# Patient Record
Sex: FEMALE | Race: OTHER | Employment: UNEMPLOYED | URBAN - METROPOLITAN AREA
[De-identification: names, ages, dates, MRNs, and addresses within clinical notes are randomized per-mention and may not be internally consistent; named-entity substitution may affect disease eponyms.]

---

## 2017-05-15 ENCOUNTER — OFFICE VISIT (OUTPATIENT)
Dept: FAMILY MEDICINE CLINIC | Age: 42
End: 2017-05-15

## 2017-05-15 ENCOUNTER — HOSPITAL ENCOUNTER (OUTPATIENT)
Dept: CT IMAGING | Age: 42
Discharge: HOME | End: 2017-05-15
Attending: FAMILY MEDICINE | Admitting: FAMILY MEDICINE

## 2017-05-15 VITALS
BODY MASS INDEX: 34.81 KG/M2 | TEMPERATURE: 97.9 F | OXYGEN SATURATION: 98 % | DIASTOLIC BLOOD PRESSURE: 80 MMHG | SYSTOLIC BLOOD PRESSURE: 110 MMHG | HEART RATE: 74 BPM | HEIGHT: 66 IN | RESPIRATION RATE: 14 BRPM | WEIGHT: 216.6 LBS

## 2017-05-15 DIAGNOSIS — R10.9 RIGHT FLANK PAIN: Primary | ICD-10-CM

## 2017-05-15 DIAGNOSIS — R30.0 DYSURIA: ICD-10-CM

## 2017-05-15 DIAGNOSIS — R10.9 RIGHT FLANK PAIN: ICD-10-CM

## 2017-05-15 LAB
BILIRUBIN, POC: NEGATIVE
BLOOD URINE, POC: ABNORMAL
CLARITY, POC: ABNORMAL
COLOR, POC: YELLOW
GLUCOSE URINE, POC: NEGATIVE
KETONES, POC: NEGATIVE
LEUKOCYTE EST, POC: ABNORMAL
NITRITE, POC: NEGATIVE
PH, POC: 5.5
PROTEIN, POC: NEGATIVE
SPECIFIC GRAVITY, POC: 1.02
UROBILINOGEN, POC: 0.2

## 2017-05-15 PROCEDURE — 99203 OFFICE O/P NEW LOW 30 MIN: CPT | Performed by: FAMILY MEDICINE

## 2017-05-15 RX ORDER — NAPROXEN 500 MG/1
TABLET ORAL
COMMUNITY
Start: 2017-02-12 | End: 2017-07-24 | Stop reason: ALTCHOICE

## 2017-05-15 RX ORDER — TAMSULOSIN HYDROCHLORIDE 0.4 MG/1
0.4 CAPSULE ORAL DAILY
Qty: 30 CAPSULE | Refills: 1 | Status: SHIPPED | OUTPATIENT
Start: 2017-05-15 | End: 2017-07-24 | Stop reason: ALTCHOICE

## 2017-05-15 RX ORDER — HYDROCODONE BITARTRATE AND ACETAMINOPHEN 5; 325 MG/1; MG/1
1 TABLET ORAL EVERY 8 HOURS PRN
Qty: 20 TABLET | Refills: 0 | Status: SHIPPED | OUTPATIENT
Start: 2017-05-15 | End: 2017-05-22

## 2017-05-22 ASSESSMENT — ENCOUNTER SYMPTOMS
VOMITING: 1
RESPIRATORY NEGATIVE: 1
NAUSEA: 1
ABDOMINAL PAIN: 1

## 2017-06-16 ENCOUNTER — OFFICE VISIT (OUTPATIENT)
Dept: FAMILY MEDICINE CLINIC | Age: 42
End: 2017-06-16

## 2017-06-16 VITALS
TEMPERATURE: 97.6 F | OXYGEN SATURATION: 98 % | HEART RATE: 90 BPM | RESPIRATION RATE: 16 BRPM | HEIGHT: 65 IN | DIASTOLIC BLOOD PRESSURE: 80 MMHG | BODY MASS INDEX: 35.75 KG/M2 | SYSTOLIC BLOOD PRESSURE: 120 MMHG | WEIGHT: 214.6 LBS

## 2017-06-16 DIAGNOSIS — R35.0 FREQUENCY OF URINATION: ICD-10-CM

## 2017-06-16 DIAGNOSIS — N30.90 CYSTITIS: Primary | ICD-10-CM

## 2017-06-16 LAB
BILIRUBIN, POC: NORMAL
BLOOD URINE, POC: NORMAL
CLARITY, POC: CLEAR
COLOR, POC: YELLOW
GLUCOSE BLD-MCNC: 155 MG/DL
GLUCOSE URINE, POC: NORMAL
KETONES, POC: NORMAL
LEUKOCYTE EST, POC: NORMAL
NITRITE, POC: NORMAL
PH, POC: 5.5
PROTEIN, POC: NORMAL
SPECIFIC GRAVITY, POC: 1.02
UROBILINOGEN, POC: NORMAL

## 2017-06-16 PROCEDURE — 82962 GLUCOSE BLOOD TEST: CPT | Performed by: CLINICAL NURSE SPECIALIST

## 2017-06-16 PROCEDURE — 99213 OFFICE O/P EST LOW 20 MIN: CPT | Performed by: CLINICAL NURSE SPECIALIST

## 2017-06-16 RX ORDER — SULFAMETHOXAZOLE AND TRIMETHOPRIM 800; 160 MG/1; MG/1
1 TABLET ORAL 2 TIMES DAILY
Qty: 14 TABLET | Refills: 0 | Status: SHIPPED | OUTPATIENT
Start: 2017-06-16 | End: 2017-06-23

## 2017-06-16 ASSESSMENT — ENCOUNTER SYMPTOMS
CONSTIPATION: 0
DIARRHEA: 0
VOMITING: 0
NAUSEA: 0
ABDOMINAL PAIN: 0

## 2017-06-18 LAB
ORGANISM: ABNORMAL
URINE CULTURE, ROUTINE: ABNORMAL

## 2017-06-19 DIAGNOSIS — R31.9 HEMATURIA: Primary | ICD-10-CM

## 2017-06-19 DIAGNOSIS — N30.01 ACUTE CYSTITIS WITH HEMATURIA: Primary | ICD-10-CM

## 2017-06-19 RX ORDER — CIPROFLOXACIN 250 MG/1
250 TABLET, FILM COATED ORAL 2 TIMES DAILY
Qty: 14 TABLET | Refills: 0 | Status: SHIPPED | OUTPATIENT
Start: 2017-06-19 | End: 2017-06-26

## 2017-07-24 ENCOUNTER — OFFICE VISIT (OUTPATIENT)
Dept: FAMILY MEDICINE CLINIC | Age: 42
End: 2017-07-24

## 2017-07-24 VITALS
HEIGHT: 65 IN | TEMPERATURE: 97.7 F | BODY MASS INDEX: 36.06 KG/M2 | WEIGHT: 216.4 LBS | OXYGEN SATURATION: 98 % | RESPIRATION RATE: 16 BRPM | HEART RATE: 84 BPM | DIASTOLIC BLOOD PRESSURE: 82 MMHG | SYSTOLIC BLOOD PRESSURE: 110 MMHG

## 2017-07-24 DIAGNOSIS — R82.90 ABNORMAL URINALYSIS: ICD-10-CM

## 2017-07-24 DIAGNOSIS — Z00.00 ANNUAL PHYSICAL EXAM: Primary | ICD-10-CM

## 2017-07-24 LAB
BILIRUBIN, POC: ABNORMAL
BLOOD URINE, POC: ABNORMAL
CLARITY, POC: CLEAR
COLOR, POC: YELLOW
GLUCOSE URINE, POC: ABNORMAL
KETONES, POC: ABNORMAL
LEUKOCYTE EST, POC: ABNORMAL
NITRITE, POC: ABNORMAL
PH, POC: 5.5
PROTEIN, POC: ABNORMAL
SPECIFIC GRAVITY, POC: 1.03
UROBILINOGEN, POC: 0.2

## 2017-07-24 PROCEDURE — 99396 PREV VISIT EST AGE 40-64: CPT | Performed by: FAMILY MEDICINE

## 2017-07-25 DIAGNOSIS — Z00.00 ANNUAL PHYSICAL EXAM: ICD-10-CM

## 2017-07-25 LAB
A/G RATIO: 1.3 (ref 1.1–2.2)
ALBUMIN SERPL-MCNC: 4 G/DL (ref 3.4–5)
ALP BLD-CCNC: 61 U/L (ref 40–129)
ALT SERPL-CCNC: 10 U/L (ref 10–40)
ANION GAP SERPL CALCULATED.3IONS-SCNC: 12 MMOL/L (ref 3–16)
AST SERPL-CCNC: 11 U/L (ref 15–37)
BASOPHILS ABSOLUTE: 0 K/UL (ref 0–0.2)
BASOPHILS RELATIVE PERCENT: 0.2 %
BILIRUB SERPL-MCNC: 0.3 MG/DL (ref 0–1)
BUN BLDV-MCNC: 19 MG/DL (ref 7–20)
CALCIUM SERPL-MCNC: 8.6 MG/DL (ref 8.3–10.6)
CHLORIDE BLD-SCNC: 103 MMOL/L (ref 99–110)
CHOLESTEROL, TOTAL: 247 MG/DL (ref 0–199)
CO2: 24 MMOL/L (ref 21–32)
CREAT SERPL-MCNC: <0.5 MG/DL (ref 0.6–1.1)
EOSINOPHILS ABSOLUTE: 0.1 K/UL (ref 0–0.6)
EOSINOPHILS RELATIVE PERCENT: 1.4 %
GFR AFRICAN AMERICAN: >60
GFR NON-AFRICAN AMERICAN: >60
GLOBULIN: 3.2 G/DL
GLUCOSE BLD-MCNC: 115 MG/DL (ref 70–99)
HCT VFR BLD CALC: 38.9 % (ref 36–48)
HDLC SERPL-MCNC: 53 MG/DL (ref 40–60)
HEMOGLOBIN: 12.9 G/DL (ref 12–16)
LDL CHOLESTEROL CALCULATED: 168 MG/DL
LYMPHOCYTES ABSOLUTE: 2.3 K/UL (ref 1–5.1)
LYMPHOCYTES RELATIVE PERCENT: 34.8 %
MCH RBC QN AUTO: 29.8 PG (ref 26–34)
MCHC RBC AUTO-ENTMCNC: 33.2 G/DL (ref 31–36)
MCV RBC AUTO: 89.6 FL (ref 80–100)
MONOCYTES ABSOLUTE: 0.4 K/UL (ref 0–1.3)
MONOCYTES RELATIVE PERCENT: 5.5 %
NEUTROPHILS ABSOLUTE: 3.9 K/UL (ref 1.7–7.7)
NEUTROPHILS RELATIVE PERCENT: 58.1 %
PDW BLD-RTO: 13.7 % (ref 12.4–15.4)
PLATELET # BLD: 217 K/UL (ref 135–450)
PMV BLD AUTO: 8.6 FL (ref 5–10.5)
POTASSIUM SERPL-SCNC: 4.3 MMOL/L (ref 3.5–5.1)
RBC # BLD: 4.34 M/UL (ref 4–5.2)
SODIUM BLD-SCNC: 139 MMOL/L (ref 136–145)
TOTAL PROTEIN: 7.2 G/DL (ref 6.4–8.2)
TRIGL SERPL-MCNC: 129 MG/DL (ref 0–150)
TSH REFLEX FT4: 2.85 UIU/ML (ref 0.27–4.2)
VITAMIN D 25-HYDROXY: 8.1 NG/ML
VLDLC SERPL CALC-MCNC: 26 MG/DL
WBC # BLD: 6.7 K/UL (ref 4–11)

## 2017-07-26 LAB
HIV-1 AND HIV-2 ANTIBODIES: NORMAL
ORGANISM: ABNORMAL
URINE CULTURE, ROUTINE: ABNORMAL

## 2017-07-29 ASSESSMENT — ENCOUNTER SYMPTOMS
GASTROINTESTINAL NEGATIVE: 1
EYES NEGATIVE: 1
RESPIRATORY NEGATIVE: 1

## 2017-08-01 DIAGNOSIS — E55.9 VITAMIN D DEFICIENCY: Primary | ICD-10-CM

## 2017-08-01 RX ORDER — ERGOCALCIFEROL 1.25 MG/1
50000 CAPSULE ORAL WEEKLY
Qty: 20 CAPSULE | Refills: 0 | Status: SHIPPED | OUTPATIENT
Start: 2017-08-01 | End: 2021-02-05

## 2017-08-29 ENCOUNTER — HOSPITAL ENCOUNTER (OUTPATIENT)
Dept: MAMMOGRAPHY | Age: 42
Discharge: HOME | End: 2017-08-29
Attending: FAMILY MEDICINE | Admitting: FAMILY MEDICINE

## 2017-08-29 DIAGNOSIS — Z12.31 VISIT FOR SCREENING MAMMOGRAM: ICD-10-CM

## 2018-07-12 ENCOUNTER — OFFICE VISIT (OUTPATIENT)
Dept: FAMILY MEDICINE CLINIC | Age: 43
End: 2018-07-12

## 2018-07-12 VITALS
HEIGHT: 65 IN | TEMPERATURE: 97.9 F | DIASTOLIC BLOOD PRESSURE: 72 MMHG | RESPIRATION RATE: 12 BRPM | OXYGEN SATURATION: 97 % | BODY MASS INDEX: 36.82 KG/M2 | SYSTOLIC BLOOD PRESSURE: 112 MMHG | WEIGHT: 221 LBS | HEART RATE: 98 BPM

## 2018-07-12 DIAGNOSIS — R60.9 DEPENDENT EDEMA: ICD-10-CM

## 2018-07-12 DIAGNOSIS — Z87.440 HISTORY OF UTI: ICD-10-CM

## 2018-07-12 DIAGNOSIS — G43.829 MENSTRUAL MIGRAINE WITHOUT STATUS MIGRAINOSUS, NOT INTRACTABLE: ICD-10-CM

## 2018-07-12 DIAGNOSIS — R31.9 HEMATURIA, UNSPECIFIED TYPE: ICD-10-CM

## 2018-07-12 DIAGNOSIS — M67.88 RIGHT PERONEAL TENDINOSIS: Primary | ICD-10-CM

## 2018-07-12 PROBLEM — R51.9 CHRONIC INTRACTABLE HEADACHE: Status: ACTIVE | Noted: 2018-07-12

## 2018-07-12 PROBLEM — G89.29 CHRONIC INTRACTABLE HEADACHE: Status: ACTIVE | Noted: 2018-07-12

## 2018-07-12 PROCEDURE — G8417 CALC BMI ABV UP PARAM F/U: HCPCS | Performed by: FAMILY MEDICINE

## 2018-07-12 PROCEDURE — 1036F TOBACCO NON-USER: CPT | Performed by: FAMILY MEDICINE

## 2018-07-12 PROCEDURE — G8427 DOCREV CUR MEDS BY ELIG CLIN: HCPCS | Performed by: FAMILY MEDICINE

## 2018-07-12 PROCEDURE — 99214 OFFICE O/P EST MOD 30 MIN: CPT | Performed by: FAMILY MEDICINE

## 2018-07-12 RX ORDER — NAPROXEN 500 MG/1
500 TABLET ORAL
COMMUNITY
Start: 2017-08-28 | End: 2018-07-18 | Stop reason: ALTCHOICE

## 2018-07-12 RX ORDER — IBUPROFEN 800 MG/1
TABLET ORAL
COMMUNITY
Start: 2016-10-03 | End: 2018-07-18 | Stop reason: ALTCHOICE

## 2018-07-12 RX ORDER — METHYLPREDNISOLONE 4 MG/1
TABLET ORAL
Qty: 1 KIT | Refills: 0 | Status: SHIPPED | OUTPATIENT
Start: 2018-07-12 | End: 2018-07-18

## 2018-07-12 NOTE — PATIENT INSTRUCTIONS
ÇáØÈíÈ REESISTII ÈåÇ¡ JESQWIVZ ÈØÈíÈß ÚäÏ SCQEKDHC ãä Ãí ãÔßáÇÊ. æãä ÇáÌíÏ ÃíÖðÇ Ãä ÊÚÑÝ äÊÇÆÌ QJRXUVKX æßÐáß EYRQDHVK ÈÞÇÆãÉ ÇáÃÏæíÉ ÇáÊí GBAZSIOR. ßíÝ íãßäß ÇáÇÚÊäÇÁ ÈäÝÓß Ýí BQDXKN¿  ÞÑÇÁÉ ÈØÇÞÇÊ QZLWGRU ÇáØÚÇã   · ÇÞÑÃ ÈØÇÞÇÊ TROGKAV ÇáØÚÇã ÇáãæÌæÏÉ Úáì ÇáÚõáÈ æÚÈæÇÊ ÇáØÚÇã. EZDFRBQPW ÊÎÈÑß ÈãÞÏÇÑ ÇáÕæÏíæã Ýí ÇáæÌÈÉ. æÊÃßÏ ãä ÇÚÊÈÇÑ ÍÌã ÇáæÌÈÉ. æÅÐÇ ÊäÇæáÊ ÃßËÑ ãä ÍÌã ÇáæÌÈÉ¡ ÝåÐÇ íÚäí ÊäÇæá ÇáãÒíÏ ãä ÇáÕæÏíæã.  · ßãÇ ÊÎÈÑß åÐå ÇáÈØÇÞÇÊ ÃíÖðÇ ÈÞíãÉ ÇáäÓÈÉ ÇáãÆæíÉ ãä ÇáÕæÏíæã Ýí Çáíæã. ÇÎÊÑ ÇáãäÊÌÇÊ ãäÎÝÖÉ ÞíãÉ ÇáäÓÈÉ ÇáãÆæíÉ ãä ÇáÕæÏíæã Ýí Çáíæã.  · æÇäÊÈå Åáì Ãä ÇáÕæÏíæã íãßä Ãä íÊÓáá Åáíß Ýí ÃÔßÇá ÃÎÑì ÛíÑ OSTIO¡ ÈãÇ íÔãá RQNAFCLBNP ÃÍÇÏí ÇáÕæÏíæã æÓíÊÑÇÊ ÇáÕæÏíæã æËäÇÆí ßÑÈæäÇÊ ÇáÕæÏíæã (ÕæÏÇ ÇáÎÈíÒ). æíßËÑ ÅÖÇÝÉ ÌáæÊÇãÇÊ ÃÍÇÏí ÇáÕæÏíæã Åáì ÇáØÚÇã ÇáÂÓíæí. æÚäÏ ÊäÇæá ÇáØÚÇã Ýí ÇáãØÚã¡ Ýíãßä Ýí ÈÚÖ ÇáÃÍíÇä ØáÈ ÃØÚãÉ áÇ ÊÍÊæí Úáì ÌáæÊÇãÇÊ ÃÍÇÏí ÇáÕæÏíæã Ãæ ÇáãáÍ ÇáãõÖÇÝ. ÔÑÇÁ ÇáÃØÚãÉ ãäÎÝÖÉ ÇáÕæÏíæã   · ÇÔÊÑö ÇáØÚÇã ÇáãßÊæÈ Úáíå \"ÛíÑ ããáøóÍ\" (ÛíÑ ãÖÇÝ Åáíå ÇáãáÍ) Ãæ \"ÎÇáö ãä ÇáÕæÏíæã\" (ÃÞá ãä 5 ãáÌã ãä ÇáÕæÏíæã Ýí ÇáæÌÈÉ) Ãæ \"Þáíá ÇáÕæÏíæã\" (ÃÞá ãä 140 ãáÌã ãä ÇáÕæÏíæã Ýí ÇáæÌÈÉ). ÑÈãÇ ÊÙá ÇáÃØÚãÉ ÇáãßÊæÈ ÚáíåÇ \"ÞáíáÉ ÇáÕæÏíæã\" Ãæ \"ÎÝíÝÉ ÇáÕæÏíæã\" ÊÍÊæí Úáì ÇáßËíÑ ãä ÇáÕæÏíæã. ÊÃßÏ ãä ÞÑÇÁÉ ÈØÇÞÉ TAVYUWKTJ áãÚÑÝÉ ãÞÏÇÑ ÇáÕæÏíæã ÇáÐí RKCWXJU.  · ÇÔÊÑö AGJEBKCGB ÇáØÇÒÌÉ Ãæ DZPSGEMLJ Ïæä ÇáÕáÕÉ ÇáãÖÇÝÉ. ÇÔÊÑö ÇáÃäæÇÚ ÞáíáÉ ÇáÕæÏíæã ãä PEBSTCHRE BYQMGEASW Ãæ ÇáÍÓÇÁ Ãæ ÛíÑåÇ ãä ÇáÃÛÐíÉ CHUMDPJKK. ÅÚÏÇÏ ÇáæÌÈÇÊ ÞáíáÉ ÇáÕæÏíæã   · Þáøá ãä ãÞÏÇÑ ÇáãáÍ ÇáÐí ÊÓÊÎÏãå Ýí ÇáØåí. æåÐÇ íÓÇÚÏ Ýí ÖÈØ ÇáØåí Úáì ÇáãÐÇÞ ÇáãäÇÓÈ. æáÇ ÊÖÝ ÇáãáÍ ÈÚÏ ÇáØåí. æÊÍÊæí ãáÚÞÉ æÇÍÏÉ ãä ÇáãáÍ Úáì äÍæ 2,300 ãáÌã ãä ÇáÕæÏíæã.  · áÇ ÊÖÚ ÃÏÇÉ æÖÚ ÇáãáÍ Úáì ÇáØÚÇã.  · íãßäß ÅÖÇÝÉ ÇáäßåÉ Åáì ÇáØÚÇã ÈÇáËæã Ãæ ÚÕíÑ Çááíãæä Ãæ ÇáÈÕá Ãæ YOYN Ãæ CRQZOOX Ãæ SWIZWOX ÈÏáÇð ãä ÇáãáÍ. áÇ ÊÖÚ ÕáÕÉ ÇáÕæíÇ æáÇ ÕáÕÉ ÇáÕæíÇ ÇáãÎÝÝÉ æáÇ ãáÍ ÇáÈÕá æáÇ ãáÍ ÇáËæã æáÇ ãáÍ ÇáßÑÝÓ æáÇ ÇáÎÑÏá æáÇ ÇáßÇÊÔÈ Úáì ÇáØÚÇã.    · ÇÓÊÎÏã ãÑÞ [facility] ZUXQNQDW QULCMTQZ ÞáíáÉ ÇáÕæÏíæã. Ãæ ÇÕäÚ ãÑÞ PXZQZQY ÇáÎÇÕ Èß Ãæ QKTVWCY ÈäÝÓß Ïæä ÅÖÇÝÉ ÇáãáÍ.  · ÇÓÊÎÏã ãÞÏÇÑðÇ ÞáíáÇð ãä ÇáãáÍ ÅÐÇ ÊØáÈÊ æÕÝÇÊ ÇáØÚÇã. æíãßäß ÛÇáÈðÇ æÖÚ äÕÝ ãÞÏÇÑ ÇáãáÍ ÇáÐí ÊÊØáÈå ÇáæÕÝÉ Ïæä ÝÞÏÇä ÇáäßåÉ. ZCMPUCGN ÇáÃÎÑì ãËá ÇáÃÑÒ æÇáãÚÌøäÇÊ æÇáÍÈæÈ áÇ ÊÍÊÇÌ Åáì ÅÖÇÝÉ ÇáãáÍ.  · ÇÔØÝ LWWZGJVXX GAVBIDLCS æÇØåíåÇ Ýí ãÇÁ ÚÐÈ. ÝåÐÇ íÒíá ÈÚÖ ÇáãáÍ æáíÓ ßáå.  · ÊÌäøÈ ÇáãÇÁ ãÑÊÝÚ ÇáÕæÏíæã ØÈíÚíðÇ Ãæ QAVMKEXT ÈÃÌåÒÉ ÅÒÇáÉ ÚÓÑ ÇáãÇÁ æÇáÊí ÊÖíÝ ÇáÕæÏíæã. ÇÊÕá ÈÔÑßÉ ÇáãíÇå ÇáãÍáíÉ áÏíß áãÚÑÝÉ ãÍÊæì ÇáÕæÏíæã Ýí ÅãÏÇÏÇÊ ÇáãíÇå. æÅÐÇ ÇÔÊÑíÊ ÇáãíÇå USKYUGX¡ ÝÇÞÑÃ ÈØÇÞÉ BBWOASOFS æÇÎÊÑ ãäÊÌÇÊ GBJMIYMJ ÇáÊÌÇÑíÉ ÇáÎÇáíÉ ãä ÇáÕæÏíæã. ÊÌäøÈ ÇáÃØÚãÉ ÚÇáíÉ ÇáÕæÏíæã   · ÊÌäøÈ ÊäÇæá: o o ÇááÍæã æÇáÃÓãÇß CDWADTZI WGEZCZODN UZEINVCPII æÇáãÚáøóÈÉ. o o Jackquelyn Reap ÝÎÐ ÇáÎäÒíÑ æáÍã ÇáÎäÒíÑ ORLSWDAL æÇáäÞÇäÞ æÇááÇäÔæä. o o ÇáÌÈä ÇáÚÇÏí Ãæ ÇáÕáÈ Ãæ RRFQXAVW æÒÈÏÉ ÇáÝæá ÇáÓæÏÇäí ÇáÚÇÏíÉ. o o VGFOTTPG ÐÇÊ ÇáÃØÑÇÝ FCXUKOXCU AKJDUSIJ ÇáÎÝíÝÉ ÇáÃÎÑì MOHYVXAED ãËá ÇáßÚß ÇáããáøóÍ ÇáÌÇÝ æÑÞÇÞÇÊ ÇáÈØÇØÓ æÇáÝÔÇÑ ÇáããáøóÍ. o o SYFZJKG ÇáÌÇåÒÉ HCTQMWBWJ ÅáÇ ÅÐÇ ßÇä ãßÊæÈ ÚáíåÇ ÞáíáÉ ÇáÕæÏíæã. o o Eric Gibes KSJTODMK æÇáÌÇÝ æÇáãÑÞ æãÑÞÉ áÍã ÇáÈÞÑ ÅáÇ ÅÐÇ ßÇä ãßÊæÈ ÚáíåÇ ÎÇáíÉ ãä Ãæ ÞáíáÉ ÇáÕæÏíæã. o o KDALJGMMT CFNFPFEXN ÅáÇ ÅÐÇ ßÇä ãßÊæÈ ÚáíåÇ ÎÇáíÉ ãä Ãæ ÞáíáÉ ÇáÕæÏíæã. o o ÔÑÇÆÍ ÇáÈØÇØÓ ÇáãÞáíÉ æÇáÈíÊÒÇ æÝØÇÆÑ ÇáÊÇßæ æÛíÑåÇ ãä ÇáÃØÚãÉ ÇáÓÑíÚÉ. o o AKACMIYR æÇáÒíÊæä æÇáßÇÊÔÈ æÛíÑåÇ ãä ÇáÊæÇÈá áÇ ÓíãÇ Ýæá ÇáÕæíÇ ÅáÇ ÅÐÇ ßÇä ãßÊæÈ ÚáíåÇ ÎÇáíÉ ãä Ãæ ÞáíáÉ ÇáÕæÏíæã. Ãíä íãßä ãÚÑÝÉ ÇáãÒíÏ¿  ÇäÊÞÇá Åáì [email or web address] ÏÎæá V843 íãßäß ãÚÑÝÉ ÇáãÒíÏ ãä ÎáÇá ãÑÈÚ ÇáÈÍË \"ÇáäÙÇã ÇáÛÐÇÆí ãäÎÝÖ ÇáÕæÏíæã (2,000 ãááíÌÑÇã): ÅÑÔÇÏÇÊ ÇáÑÚÇíÉ - [ Low Sodium Diet (2,000 Milligram): Care Instructions ]. \"  © 1056-4397 [employer], Incorporated. ÊãÊ ÊåíÆÉ ÅÑÔÇÏÇÊ ÇáÚäÇíÉ ÈãæÌÈ ÊÑÎíÕ ãä ãÎÊÕ ÇáÑÚÇíÉ ÇáÕÍíÉ áÏíß. ÅÐÇ ßÇäÊ áÏíß ÃíÉ XQHBGOTHV Úä ÍÇáÉ ØÈíÉ Ãæ Ãí ãä åÐå UAMLIRDTE¡ ÝÊæÌå ÏæãðÇ WNMTNSO Åáì ãÎÊÕ ÇáÑÚÇíÉ ÇáÕÍíÉ.  ÊäÝí ãäÙãÉ [employer] Incorporated Tash Lack ÖãÇä Ãæ Kevon Sam ZDIPBCRISTIAN åÐå LKEAUXZNK.   ÅÕÏÇÑ ÇáãÍÊæì: 11.6 Delmer Cruz ãä: 8 ÔÚ 1438

## 2018-07-18 PROBLEM — R60.9 DEPENDENT EDEMA: Status: ACTIVE | Noted: 2018-07-18

## 2018-07-18 PROBLEM — R31.9 HEMATURIA: Status: ACTIVE | Noted: 2018-07-18

## 2018-07-18 PROBLEM — G43.829 MENSTRUAL MIGRAINE WITHOUT STATUS MIGRAINOSUS, NOT INTRACTABLE: Status: ACTIVE | Noted: 2018-07-18

## 2018-07-18 PROBLEM — M76.71 PERONEAL TENDINITIS OF RIGHT LOWER EXTREMITY: Status: ACTIVE | Noted: 2017-10-10

## 2018-07-18 ASSESSMENT — ENCOUNTER SYMPTOMS
EYES NEGATIVE: 1
RESPIRATORY NEGATIVE: 1

## 2018-07-19 NOTE — PROGRESS NOTES
Mauricio Tenorio   YOB: 1975    Date of Visit:  2018        Chief Complaint   Patient presents with    Foot Pain     Right foot pain for 3 years    Edema    Urinary Tract Infection    Migraine         HPI:    She presents with multiple complaints. Right foot pain. Ongoing for 3 years. Symptoms waxes and wanes. Imaging studies consisting of x-ray and MRI show no abnormality. Was diagnosed with peroneal tendinitis and had steroid injection which helped for some time. Has been using ibuprofen with inadequate relief. Follows with orthopedic specialists at  [address]. Bilateral lower extremity edema. Ongoing for years. Symptoms worse with prolonged standing and sitting. Elevating legs alleviate. Denies pain and skin changes. UTI. Reports to history of UTI several months ago, successfully treated with antibiotics. She denies any symptoms at this time. Patient reports to history of long-standing hematuria. Has never had workup for the condition. Requesting repeat urinalysis. Migraine. Comes on a few days prior to her menstrual period and would last for the duration of her period. Describes dull ache encompassing her entire head. She denies vision and neurologic changes. Has tried OTC NSAIDs with no relief this time. History was taken from patient with assistance of Welsh language interpreting services.         Patient Active Problem List   Diagnosis    Peroneal tendinitis of right lower extremity    Dependent edema    Hematuria    Menstrual migraine without status migrainosus, not intractable         Past Surgical History:   Procedure Laterality Date     SECTION      Two C sections    UTERINE FIBROID SURGERY           Family History   Problem Relation Age of Onset    Breast Cancer Mother 39    Diabetes Father          Social History     Social History    Marital status:      Spouse name: N/A    Number of children: 5    Years of education: N/A     Occupational History    Unemployed      Social History Main Topics    Smoking status: Passive Smoke Exposure - Never Smoker    Smokeless tobacco: Never Used    Alcohol use No    Drug use: No    Sexual activity: Yes     Partners: Male         No Known Allergies        Current Outpatient Prescriptions   Medication Sig Dispense Refill     No current facility-administered medications for this visit. Review of Systems   Constitutional: Negative. HENT: Negative. Eyes: Negative. Respiratory: Negative. Cardiovascular: Negative. Genitourinary: Positive for hematuria. Musculoskeletal: Positive for joint pain (right ankle). Neurological: Positive for headaches. Physical Exam   Constitutional: She appears well-developed and well-nourished. No distress. HENT:   Head: Normocephalic and atraumatic. Right Ear: Hearing and external ear normal.   Left Ear: Hearing and external ear normal.   Nose: Nose normal. No rhinorrhea. Eyes: Conjunctivae and EOM are normal. No scleral icterus. Neck: Neck supple. No JVD present. No thyromegaly present. Cardiovascular: Normal rate, regular rhythm and intact distal pulses. Pulmonary/Chest: Effort normal and breath sounds normal.   Abdominal: Soft. Bowel sounds are normal. There is no tenderness. Musculoskeletal: Normal range of motion. She exhibits edema (1+ bilateral) and tenderness (right lateral ankle). She exhibits no deformity. Neurological: She is alert. She is not disoriented. No cranial nerve deficit. Vitals reviewed. Vitals:    07/12/18 1504   BP: 112/72   Site: Right Arm   Position: Sitting   Cuff Size: Large Adult   Pulse: 98   Resp: 12   Temp: 97.9 °F (36.6 °C)   TempSrc: Oral   SpO2: 97%   Weight: 221 lb (100.2 kg)   Height: 5' 5\" (1.651 m)     Body mass index is 36.78 kg/m².      Wt Readings from Last 3 Encounters:   07/12/18 221 lb (100.2 kg)   07/24/17 216 lb 6.4 oz (98.2 kg)   06/16/17 214 lb 9.6 oz (97.3 kg)     BP Readings

## 2019-10-31 ENCOUNTER — OFFICE VISIT (OUTPATIENT)
Dept: PRIMARY CARE CLINIC | Age: 44
End: 2019-10-31
Payer: MEDICARE

## 2019-10-31 VITALS
HEIGHT: 63 IN | SYSTOLIC BLOOD PRESSURE: 130 MMHG | WEIGHT: 217.6 LBS | TEMPERATURE: 98 F | DIASTOLIC BLOOD PRESSURE: 74 MMHG | HEART RATE: 70 BPM | BODY MASS INDEX: 38.55 KG/M2 | OXYGEN SATURATION: 98 %

## 2019-10-31 DIAGNOSIS — M25.571 CHRONIC PAIN OF RIGHT ANKLE: ICD-10-CM

## 2019-10-31 DIAGNOSIS — G89.29 CHRONIC PAIN OF RIGHT ANKLE: ICD-10-CM

## 2019-10-31 DIAGNOSIS — R53.83 FATIGUE, UNSPECIFIED TYPE: ICD-10-CM

## 2019-10-31 DIAGNOSIS — Z12.39 SCREENING FOR BREAST CANCER: ICD-10-CM

## 2019-10-31 DIAGNOSIS — Z00.00 ANNUAL PHYSICAL EXAM: Primary | ICD-10-CM

## 2019-10-31 PROCEDURE — 99396 PREV VISIT EST AGE 40-64: CPT | Performed by: FAMILY MEDICINE

## 2019-10-31 ASSESSMENT — ENCOUNTER SYMPTOMS
EYES NEGATIVE: 1
RESPIRATORY NEGATIVE: 1
ALLERGIC/IMMUNOLOGIC NEGATIVE: 1

## 2019-12-02 DIAGNOSIS — Z00.00 ANNUAL PHYSICAL EXAM: ICD-10-CM

## 2019-12-02 LAB
ALBUMIN SERPL-MCNC: 4.1 G/DL (ref 3.4–5)
ALP BLD-CCNC: 66 U/L (ref 40–129)
ALT SERPL-CCNC: 9 U/L (ref 10–40)
ANION GAP SERPL CALCULATED.3IONS-SCNC: 13 MMOL/L (ref 3–16)
AST SERPL-CCNC: 16 U/L (ref 15–37)
BILIRUB SERPL-MCNC: 0.3 MG/DL (ref 0–1)
BILIRUBIN DIRECT: <0.2 MG/DL (ref 0–0.3)
BILIRUBIN, INDIRECT: ABNORMAL MG/DL (ref 0–1)
BUN BLDV-MCNC: 14 MG/DL (ref 7–20)
CALCIUM SERPL-MCNC: 8.5 MG/DL (ref 8.3–10.6)
CHLORIDE BLD-SCNC: 104 MMOL/L (ref 99–110)
CHOLESTEROL, TOTAL: 217 MG/DL (ref 0–199)
CO2: 22 MMOL/L (ref 21–32)
CREAT SERPL-MCNC: <0.5 MG/DL (ref 0.6–1.1)
GFR AFRICAN AMERICAN: >60
GFR NON-AFRICAN AMERICAN: >60
GLUCOSE BLD-MCNC: 113 MG/DL (ref 70–99)
HCT VFR BLD CALC: 39.5 % (ref 36–48)
HDLC SERPL-MCNC: 49 MG/DL (ref 40–60)
HEMOGLOBIN: 13.2 G/DL (ref 12–16)
LDL CHOLESTEROL CALCULATED: 140 MG/DL
MCH RBC QN AUTO: 29.6 PG (ref 26–34)
MCHC RBC AUTO-ENTMCNC: 33.5 G/DL (ref 31–36)
MCV RBC AUTO: 88.5 FL (ref 80–100)
PDW BLD-RTO: 13.4 % (ref 12.4–15.4)
PLATELET # BLD: 206 K/UL (ref 135–450)
PMV BLD AUTO: 8.4 FL (ref 5–10.5)
POTASSIUM SERPL-SCNC: 4.2 MMOL/L (ref 3.5–5.1)
RBC # BLD: 4.46 M/UL (ref 4–5.2)
SODIUM BLD-SCNC: 139 MMOL/L (ref 136–145)
TOTAL PROTEIN: 6.7 G/DL (ref 6.4–8.2)
TRIGL SERPL-MCNC: 140 MG/DL (ref 0–150)
TSH SERPL DL<=0.05 MIU/L-ACNC: 3.03 UIU/ML (ref 0.27–4.2)
VLDLC SERPL CALC-MCNC: 28 MG/DL
WBC # BLD: 5.6 K/UL (ref 4–11)

## 2019-12-03 LAB
ESTIMATED AVERAGE GLUCOSE: 125.5 MG/DL
HBA1C MFR BLD: 6 %

## 2019-12-04 ENCOUNTER — TELEPHONE (OUTPATIENT)
Dept: PRIMARY CARE CLINIC | Age: 44
End: 2019-12-04

## 2019-12-05 ENCOUNTER — OFFICE VISIT (OUTPATIENT)
Dept: ORTHOPEDIC SURGERY | Age: 44
End: 2019-12-05
Payer: MEDICARE

## 2019-12-05 VITALS — WEIGHT: 217 LBS | HEIGHT: 63 IN | BODY MASS INDEX: 38.45 KG/M2

## 2019-12-05 DIAGNOSIS — M25.572 BILATERAL ANKLE PAIN, UNSPECIFIED CHRONICITY: ICD-10-CM

## 2019-12-05 DIAGNOSIS — M25.372 INSTABILITY OF JOINTS OF BOTH ANKLES: Primary | ICD-10-CM

## 2019-12-05 DIAGNOSIS — M25.571 BILATERAL ANKLE PAIN, UNSPECIFIED CHRONICITY: ICD-10-CM

## 2019-12-05 DIAGNOSIS — M25.371 INSTABILITY OF JOINTS OF BOTH ANKLES: Primary | ICD-10-CM

## 2019-12-05 PROCEDURE — G8417 CALC BMI ABV UP PARAM F/U: HCPCS | Performed by: ORTHOPAEDIC SURGERY

## 2019-12-05 PROCEDURE — G8428 CUR MEDS NOT DOCUMENT: HCPCS | Performed by: ORTHOPAEDIC SURGERY

## 2019-12-05 PROCEDURE — 99243 OFF/OP CNSLTJ NEW/EST LOW 30: CPT | Performed by: ORTHOPAEDIC SURGERY

## 2019-12-05 PROCEDURE — G8484 FLU IMMUNIZE NO ADMIN: HCPCS | Performed by: ORTHOPAEDIC SURGERY

## 2019-12-05 RX ORDER — NAPROXEN 500 MG/1
500 TABLET ORAL 2 TIMES DAILY WITH MEALS
Qty: 60 TABLET | Refills: 0 | Status: SHIPPED | OUTPATIENT
Start: 2019-12-05 | End: 2021-05-27

## 2019-12-10 ENCOUNTER — HOSPITAL ENCOUNTER (OUTPATIENT)
Dept: PHYSICAL THERAPY | Age: 44
Setting detail: THERAPIES SERIES
Discharge: HOME | End: 2019-12-10
Payer: MEDICARE

## 2019-12-10 ENCOUNTER — TELEPHONE (OUTPATIENT)
Dept: PRIMARY CARE CLINIC | Age: 44
End: 2019-12-10

## 2019-12-10 PROCEDURE — 97530 THERAPEUTIC ACTIVITIES: CPT | Performed by: PHYSICAL THERAPIST

## 2019-12-10 PROCEDURE — 97110 THERAPEUTIC EXERCISES: CPT | Performed by: PHYSICAL THERAPIST

## 2019-12-10 PROCEDURE — 97161 PT EVAL LOW COMPLEX 20 MIN: CPT | Performed by: PHYSICAL THERAPIST

## 2019-12-10 PROCEDURE — 97140 MANUAL THERAPY 1/> REGIONS: CPT | Performed by: PHYSICAL THERAPIST

## 2019-12-12 ENCOUNTER — OFFICE VISIT (OUTPATIENT)
Dept: PRIMARY CARE CLINIC | Age: 44
End: 2019-12-12
Payer: MEDICARE

## 2019-12-12 VITALS
DIASTOLIC BLOOD PRESSURE: 83 MMHG | BODY MASS INDEX: 38.51 KG/M2 | HEART RATE: 72 BPM | SYSTOLIC BLOOD PRESSURE: 128 MMHG | RESPIRATION RATE: 12 BRPM | OXYGEN SATURATION: 99 % | WEIGHT: 217.4 LBS

## 2019-12-12 DIAGNOSIS — R35.89 POLYURIA: Primary | ICD-10-CM

## 2019-12-12 DIAGNOSIS — R73.9 HYPERGLYCEMIA: ICD-10-CM

## 2019-12-12 DIAGNOSIS — Z12.39 SCREENING FOR BREAST CANCER: ICD-10-CM

## 2019-12-12 LAB
BILIRUBIN, POC: ABNORMAL
BLOOD URINE, POC: ABNORMAL
CLARITY, POC: ABNORMAL
COLOR, POC: ABNORMAL
GLUCOSE URINE, POC: ABNORMAL
KETONES, POC: ABNORMAL
LEUKOCYTE EST, POC: ABNORMAL
NITRITE, POC: ABNORMAL
PH, POC: 5.5
PROTEIN, POC: ABNORMAL
SPECIFIC GRAVITY, POC: 1.03
UROBILINOGEN, POC: 0.2

## 2019-12-12 PROCEDURE — 99214 OFFICE O/P EST MOD 30 MIN: CPT | Performed by: FAMILY MEDICINE

## 2019-12-12 PROCEDURE — 1036F TOBACCO NON-USER: CPT | Performed by: FAMILY MEDICINE

## 2019-12-12 PROCEDURE — G8484 FLU IMMUNIZE NO ADMIN: HCPCS | Performed by: FAMILY MEDICINE

## 2019-12-12 PROCEDURE — G8417 CALC BMI ABV UP PARAM F/U: HCPCS | Performed by: FAMILY MEDICINE

## 2019-12-12 PROCEDURE — G8427 DOCREV CUR MEDS BY ELIG CLIN: HCPCS | Performed by: FAMILY MEDICINE

## 2019-12-12 RX ORDER — CIPROFLOXACIN 500 MG/1
500 TABLET, FILM COATED ORAL 2 TIMES DAILY
Qty: 20 TABLET | Refills: 0 | Status: SHIPPED | OUTPATIENT
Start: 2019-12-12 | End: 2019-12-22

## 2019-12-12 ASSESSMENT — PATIENT HEALTH QUESTIONNAIRE - PHQ9
2. FEELING DOWN, DEPRESSED OR HOPELESS: 0
SUM OF ALL RESPONSES TO PHQ QUESTIONS 1-9: 0
SUM OF ALL RESPONSES TO PHQ QUESTIONS 1-9: 0
1. LITTLE INTEREST OR PLEASURE IN DOING THINGS: 0
SUM OF ALL RESPONSES TO PHQ9 QUESTIONS 1 & 2: 0

## 2019-12-13 ASSESSMENT — ENCOUNTER SYMPTOMS
RESPIRATORY NEGATIVE: 1
ABDOMINAL PAIN: 1
EYES NEGATIVE: 1

## 2019-12-14 LAB
ORGANISM: ABNORMAL
URINE CULTURE, ROUTINE: ABNORMAL

## 2019-12-19 ENCOUNTER — HOSPITAL ENCOUNTER (OUTPATIENT)
Dept: PHYSICAL THERAPY | Age: 44
Setting detail: THERAPIES SERIES
Discharge: HOME | End: 2019-12-19
Payer: MEDICARE

## 2019-12-19 PROCEDURE — 97530 THERAPEUTIC ACTIVITIES: CPT | Performed by: PHYSICAL THERAPIST

## 2019-12-19 PROCEDURE — 97140 MANUAL THERAPY 1/> REGIONS: CPT | Performed by: PHYSICAL THERAPIST

## 2019-12-19 PROCEDURE — 97110 THERAPEUTIC EXERCISES: CPT | Performed by: PHYSICAL THERAPIST

## 2020-01-06 ENCOUNTER — HOSPITAL ENCOUNTER (OUTPATIENT)
Dept: PHYSICAL THERAPY | Age: 45
Setting detail: THERAPIES SERIES
Discharge: HOME | End: 2020-01-06
Payer: MEDICARE

## 2020-01-06 PROCEDURE — 97140 MANUAL THERAPY 1/> REGIONS: CPT | Performed by: PHYSICAL THERAPIST

## 2020-01-06 PROCEDURE — 97110 THERAPEUTIC EXERCISES: CPT | Performed by: PHYSICAL THERAPIST

## 2020-01-06 PROCEDURE — 97530 THERAPEUTIC ACTIVITIES: CPT | Performed by: PHYSICAL THERAPIST

## 2020-01-06 NOTE — FLOWSHEET NOTE
Vadim Farooq    Physical Therapy Treatment Note/ Progress Report:     Date:  2020    Patient Name:  Kolby Hernandez   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 Date Range   3 30 PCY []  Yes  []  No    2 visits used   Latex Allergy:  [x]NO      []YES  Preferred Language for Healthcare:   [x]English       []other:    Functional Scale: LEFS 39 with disability of 40-59%  Date assessed: 12/10/19    Pain level: 4-5/10     History:  Patient fell on R ankle 4 years ago resulting in torn tendons, and never had proper treatment. Patient continued to have pain and saw many MD, finally seeing Dr. Valentín Arce. He wants pt to do PT to strengthen her ankle prior to a possible surgery. She is also c/o her L ankle - d/t over compensation.     SUBJECTIVE: pt notes a little improvement in the ankles. Pain onsets with prolonged standing - malcom noted with cooking at home    OBJECTIVE: See eval      RESTRICTIONS/PRECAUTIONS: NEEDS  - curtain closed when another male is present    Exercises/Interventions:   R ankle pain with weakness  Therapeutic Exercise (92083)  Resistance / level Sets/sec Reps Notes / Samira Copping #3 1. 2mph  4'    IB  30\" 2    Foam:  NBOS [facility] Traction (81831)  [] Gait Training x     [] ES (un) (30675):   [] Aquatic therapy x   [] Other:   [] Group:    GOALS:    Patient stated goal: get stronger  []? Progressing: []? Met: [x]? Not Met: []? Adjusted     Therapist goals for Patient:   Short Term Goals: To be achieved in: 2 weeks  1. Independent in HEP and progression per patient tolerance, in order to prevent re-injury. []? Progressing: []? Met: [x]? Not Met: []? Adjusted  2. Patient will have a decrease in pain to facilitate improvement in movement, function, and ADLs as indicated by Functional Deficits. []? Progressing: []? Met: [x]? Not Met: []? Adjusted     Long Term Goals: To be achieved in: 6 weeks  1. Disability index score of 20% or less for the LEFS to assist with reaching prior level of function. []? Progressing: []? Met: [x]? Not Met: []? Adjusted  2. Patient will demonstrate increased R ankle AROM to WNLs all planes to allow for proper joint functioning as indicated by patients Functional Deficits. []? Progressing: []? Met: [x]? Not Met: []? Adjusted  3. Patient will demonstrate an increase in Strength to at least gross 4+/5 as well as good proximal hip strength and control to allow for proper functional mobility as indicated by patients Functional Deficits. []? Progressing: []? Met: [x]? Not Met: []? Adjusted  4. Patient will return to functional activities including tolerate standing to cook and clean without increased symptoms or restriction. []? Progressing: []? Met: [x]? Not Met: []? Adjusted  5. Overall report 75% improvement in pain and function with WB ADLs  []? Progressing: []? Met: [x]? Not Met: []? Adjusted     Overall Progression Towards Functional goals/ Treatment Progress Update:  [] Patient is progressing as expected towards functional goals listed. [] Progression is slowed due to complexities/Impairments listed. [] Progression has been slowed due to co-morbidities.   [x] Plan just implemented, too soon to

## 2020-01-09 ENCOUNTER — NURSE ONLY (OUTPATIENT)
Dept: PRIMARY CARE CLINIC | Age: 45
End: 2020-01-09

## 2020-01-13 ENCOUNTER — HOSPITAL ENCOUNTER (OUTPATIENT)
Dept: PHYSICAL THERAPY | Age: 45
Setting detail: THERAPIES SERIES
Discharge: HOME | End: 2020-01-13
Payer: MEDICARE

## 2020-01-13 PROCEDURE — 97530 THERAPEUTIC ACTIVITIES: CPT | Performed by: PHYSICAL THERAPIST

## 2020-01-13 PROCEDURE — 97110 THERAPEUTIC EXERCISES: CPT | Performed by: PHYSICAL THERAPIST

## 2020-01-13 PROCEDURE — 97140 MANUAL THERAPY 1/> REGIONS: CPT | Performed by: PHYSICAL THERAPIST

## 2020-01-13 NOTE — FLOWSHEET NOTE
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 Date Range   4 30 PCY []  Yes  []  No    2 visits used   Latex Allergy:  [x]NO      []YES  Preferred Language for Healthcare:   [x]English       []other:    Functional Scale: LEFS 39 with disability of 40-59%  Date assessed: 12/10/19    Pain level: 3-4/10     History:  Patient fell on R ankle 4 years ago resulting in torn tendons, and never had proper treatment. Patient continued to have pain and saw many MD, finally seeing Dr. Jules Montes. He wants pt to do PT to strengthen her ankle prior to a possible surgery.  She is also c/o her L ankle - d/t over compensation.     SUBJECTIVE: Pt reports L ankle pain today 3-4/10, still noted with WB ADLs    OBJECTIVE: See eval      RESTRICTIONS/PRECAUTIONS: NEEDS  - curtain closed when another male is present    Exercises/Interventions:   R ankle pain with weakness  Therapeutic Exercise (78160)  Resistance / level Sets/sec Reps Notes / Jules Jocelin #3 1.5 mph  5'    IB  30\" 2    Foam:  NBOS EC  HR/TR  Squat  Hip flexion  Hip abd    1   30\"  20 x ea  20  10 B  10 B           Seated Baps 4 way L2   B ankles          TB 4 way ankle HEP   [place]  DF  PF  IN  EV   3.75#  7.5#  2.5#  3.75#    15 B  15 B  15 B  15 B                  Therapeutic Activities (42307)       FSU  LSU  Step down 6\"  6\"    10 L/R  10 L/R  add    Tandem stance  SLS  30\"  15\" 1 L/R  2 L/R 1 finger touch   BOSU lunge   10 L/R    Gait in // bars:  TB Lat steps  Tandem  march   orange    4 lengths  4 lengths  4 lengths     Light fingers   Neuromuscular Re-ed (41667)              rockerboard   add           Manual Intervention (47352)              STM for edema R ankle   8'    Ankle AROM B ankle   10 ea    Ankle mobs B ankle   4'    K tape for edema    prn              Pt. Education: 12/10:educated to stop and rest every hour during the day when cooking and cleaning. We also discussed getting an ankle compression support sleeve for edema, pain, and some support. This PT discouraged high top shoes.  -pt educated on diagnosis, prognosis and expectations for rehab  -pt provided with written and illustrated instructions for HEP  -all pt questions were answered    Therapeutic Exercise and NMR EXR  [x] (93043) Provided verbal/tactile cueing for activities related to strengthening, flexibility, endurance, ROM for improvements in LE, proximal hip, and core control with self care, mobility, lifting, ambulation.  [] (19388) Provided verbal/tactile cueing for activities related to improving balance, coordination, kinesthetic sense, posture, motor skill, proprioception  to assist with LE, proximal hip, and core control in self care, mobility, lifting, ambulation and eccentric single leg control.      NMR and Therapeutic Activities:    [] (17480 or 64114) Provided verbal/tactile cueing for activities related to improving balance, coordination, kinesthetic sense, posture, motor skill, proprioception and motor activation to allow for proper function of core, proximal hip and LE with self care and ADLs  [] (06363) Gait Re-education- Provided training and instruction to the patient for proper LE, core and proximal hip recruitment and positioning and eccentric body weight control with ambulation re-education including up and down stairs     Home Exercise Program:   1/13: standing HR, TR, SLS, squat  12/19: blue TB 4 way ankle  12/10: ankle AROM 4 way and circles, toe curls, seated IN/EV (windhseild wipers), towel calf str, CP. [x] (97559) Reviewed/Progressed HEP activities related to strengthening, flexibility, endurance, ROM of core, proximal hip and LE for functional self-care, mobility, lifting and ambulation/stair navigation   [] (25514)Reviewed/Progressed HEP activities related to improving balance, coordination, kinesthetic sense, posture, motor skill, proprioception of core, proximal hip and LE for self care, mobility, lifting, and ambulation/stair navigation      Manual Treatments:  PROM / STM / Oscillations-Mobs:  G-I, II, III, IV (PA's, Inf., Post.)  [x] (99583) Provided manual therapy to mobilize LE, proximal hip and/or LS spine soft tissue/joints for the purpose of modulating pain, promoting relaxation,  increasing ROM, reducing/eliminating soft tissue swelling/inflammation/restriction, improving soft tissue extensibility and allowing for proper ROM for normal function with self care, mobility, lifting and ambulation. Modalities:  [] (72178) Vasopneumatic compression: Utilized vasopneumatic compression to decrease edema / swelling for the purpose of improving mobility and quad tone / recruitment which will allow for increased overall function including but not limited to self-care, transfers, ambulation, and ascending / descending stairs.        Modalities:  NA  Charges:  Timed Code Treatment Minutes: 55   Total Treatment Minutes: 55     [] EVAL - LOW (82336)   [] EVAL - MOD (82352)  [] EVAL - HIGH (36207)  [] RE-EVAL (40778)  [x] TE (50230) x 2     [] Ionto (26648)  [] NMR (79976) x      [] Vaso

## 2020-01-14 ENCOUNTER — TELEPHONE (OUTPATIENT)
Dept: ORTHOPEDIC SURGERY | Age: 45
End: 2020-01-14

## 2020-01-14 NOTE — TELEPHONE ENCOUNTER
Called and spoke to Romain informed him that if patient cannot take Naprosyn she will be unable to take Mobic. Informed him that patient should take OTC tylenol.  Romain in agreement to plan

## 2020-01-14 NOTE — TELEPHONE ENCOUNTER
Patient son Anne Sotelo called states patient is unable to take naproxen. He is calling to see if a different prescription can be called in for patient.     Please call Anne Sotelo:  [phone]

## 2020-01-21 ENCOUNTER — HOSPITAL ENCOUNTER (OUTPATIENT)
Dept: PHYSICAL THERAPY | Age: 45
Setting detail: THERAPIES SERIES
Discharge: HOME | End: 2020-01-21
Payer: MEDICARE

## 2020-01-21 PROCEDURE — 97110 THERAPEUTIC EXERCISES: CPT | Performed by: PHYSICAL THERAPIST

## 2020-01-21 PROCEDURE — 97035 APP MDLTY 1+ULTRASOUND EA 15: CPT | Performed by: PHYSICAL THERAPIST

## 2020-01-21 PROCEDURE — 97140 MANUAL THERAPY 1/> REGIONS: CPT | Performed by: PHYSICAL THERAPIST

## 2020-01-21 NOTE — FLOWSHEET NOTE
ea  10  10 B  10 B           Seated Baps 4 way L3  10 x ea B ankles  R side more difficult          TB 4 way ankle HEP   [place]  DF  PF  IN  EV                  Therapeutic Activities (94234)       FSU  LSU  Step down    Tandem stance  SLS 1 finger touch   BOSU lunge    Gait in // bars:  TB Lat steps  Tandem  march     Light fingers   Neuromuscular Re-ed (64831)              rockerboard   add           Manual Intervention (18383)              STM for edema B ankle   8'    Ankle AROM B ankle   10 ea    Ankle mobs B ankle   4'    K tape for edema    Prn - try NPV              Pt. Education: 12/10:educated to stop and rest every hour during the day when cooking and cleaning. We also discussed getting an ankle compression support sleeve for edema, pain, and some support. This PT discouraged high top shoes.  -pt educated on diagnosis, prognosis and expectations for rehab  -pt provided with written and illustrated instructions for HEP  -all pt questions were answered    Therapeutic Exercise and NMR EXR  [x] (96565) Provided verbal/tactile cueing for activities related to strengthening, flexibility, endurance, ROM for improvements in LE, proximal hip, and core control with self care, mobility, lifting, ambulation.  [] (06295) Provided verbal/tactile cueing for activities related to improving balance, coordination, kinesthetic sense, posture, motor skill, proprioception  to assist with LE, proximal hip, and core control in self care, mobility, lifting, ambulation and eccentric single leg control.      NMR and Therapeutic Activities:    [] (34819 or 26895) Provided verbal/tactile cueing for activities related to improving balance, coordination, kinesthetic sense, posture, motor skill, proprioception and motor activation to allow for proper function of core, proximal hip and LE with self care and ADLs  [] (13950) Gait Re-education- Provided training and instruction to the patient for proper LE, core and proximal hip recruitment and positioning and eccentric body weight control with ambulation re-education including up and down stairs     Home Exercise Program:   1/13: standing HR, TR, SLS, squat  12/19: blue TB 4 way ankle  12/10: ankle AROM 4 way and circles, toe curls, seated IN/EV (windhseild wipers), towel calf str, CP. [x] (76855) Reviewed/Progressed HEP activities related to strengthening, flexibility, endurance, ROM of core, proximal hip and LE for functional self-care, mobility, lifting and ambulation/stair navigation   [] (44125)Reviewed/Progressed HEP activities related to improving balance, coordination, kinesthetic sense, posture, motor skill, proprioception of core, proximal hip and LE for self care, mobility, lifting, and ambulation/stair navigation      Manual Treatments:  PROM / STM / Oscillations-Mobs:  G-I, II, III, IV (PA's, Inf., Post.)  [x] (28254) Provided manual therapy to mobilize LE, proximal hip and/or LS spine soft tissue/joints for the purpose of modulating pain, promoting relaxation,  increasing ROM, reducing/eliminating soft tissue swelling/inflammation/restriction, improving soft tissue extensibility and allowing for proper ROM for normal function with self care, mobility, lifting and ambulation. Modalities:  [] (44832) Vasopneumatic compression: Utilized vasopneumatic compression to decrease edema / swelling for the purpose of improving mobility and quad tone / recruitment which will allow for increased overall function including but not limited to self-care, transfers, ambulation, and ascending / descending stairs.        Modalities: 1/21: Trial US @ 100% 1.5w/cm x 5min B lateral ankle with biofreeze, prior to manual (add K-tape to R ankle next session)    Charges:  Timed Code Treatment Minutes: 55   Total Treatment Minutes: 55     [] EVAL - LOW (19846)   [] EVAL - MOD (76773)  [] EVAL - HIGH (34050)  [] RE-EVAL (80602)  [x] TE (66247) x 2    [] Ionto (49337)  [] NMR (10923) x      [] Vaso (27282)  [x] to co-morbidities. [x] Plan just implemented, too soon to assess goals progression <30days   [] Goals require adjustment due to lack of progress  [] Patient is not progressing as expected and requires additional follow up with physician  [] Other    Persisting Functional Limitations/Impairments:  []Sitting [x]Standing   [x]Walking []Stairs   []Transfers [x]ADLs   [x]Squatting/bending []Kneeling  [x]Housework []Job related tasks  []Driving []Sports/Recreation   []Sleeping []Other:    ASSESSMENT:  Patient was able to tolerate increased ankle stab and strengthening exercises today. She was sore and fatigued at end of performing exercise. Decreased edema noted in the lateral ankle, however pt is still point tender in there peroneals. Treatment/Activity Tolerance:  [] Pt able to complete treatment [] Patient limited by fatique  [x] Patient limited by pain  [] Patient limited by other medical complications  [] Other:     Prognosis:  [x] Good [] Fair  [] Poor    Patient Requires Follow-up: [x] Yes  [] No    Return to Play:    [x]  N/A   []  Stage 1: Intro to Strength   []  Stage 2: Return to Run and Strength   []  Stage 3: Return to Jump and Strength   []  Stage 4: Dynamic Strength and Agility   []  Stage 5: Sport Specific Training     []  Ready to Return to Play, Meets All Above Stages   []  Not Ready for Return to Sports   Comments:            Plan for next treatment session: progress strengthening, assess of benefit of US prn, try K-tape to R ankle    PLAN: See eval. PT 1-2x / week for 6 weeks. [x] Continue per plan of care [] Alter current plan (see comments)  [] Plan of care initiated [] Hold pending MD visit [] Discharge    Electronically signed by: Nathaniel Naik PT, MPT      Note: If patient does not return for scheduled/ recommended follow up visits, his note will serve as a discharge from care along with most recent update on progress.

## 2020-01-23 ENCOUNTER — HOSPITAL ENCOUNTER (OUTPATIENT)
Dept: PHYSICAL THERAPY | Age: 45
Setting detail: THERAPIES SERIES
Discharge: HOME | End: 2020-01-23
Payer: MEDICARE

## 2020-01-23 PROCEDURE — 97140 MANUAL THERAPY 1/> REGIONS: CPT | Performed by: PHYSICAL THERAPIST

## 2020-01-23 PROCEDURE — 97035 APP MDLTY 1+ULTRASOUND EA 15: CPT | Performed by: PHYSICAL THERAPIST

## 2020-01-23 PROCEDURE — 97110 THERAPEUTIC EXERCISES: CPT | Performed by: PHYSICAL THERAPIST

## 2020-01-23 NOTE — FLOWSHEET NOTE
EC  HR/TR  Squat  march  Hip flexion  Hip abd    1   30\"  10 x ea  10  10 alt  10 B  10 B   CGA for balance          Seated Baps 4 way L3  10 x ea B ankles  R side more difficult          TB 4 way ankle HEP   [place]  DF  PF  IN  EV                  Therapeutic Activities (17483)       FSU  LSU  Toe tap alt  Step down 4\"  4\"  4\"  10 L/R  10 L/R  10x    Tandem stance  SLS 1 finger touch   BOSU lunge 10 L/R   Gait in // bars:   Lat steps  Tandem  march 4 lengths  4 lengths  4 lengths    Light fingers   Neuromuscular Re-ed (52410)              rockerboard   add           Manual Intervention (38951)              STM for edema B ankle   8'    Ankle AROM B ankle   10 ea    Ankle mobs B ankle   4'    K tape for edema Web around lat wendy                 Pt. Education: 12/10:educated to stop and rest every hour during the day when cooking and cleaning. We also discussed getting an ankle compression support sleeve for edema, pain, and some support. This PT discouraged high top shoes.  -pt educated on diagnosis, prognosis and expectations for rehab  -pt provided with written and illustrated instructions for HEP  -all pt questions were answered    Therapeutic Exercise and NMR EXR  [x] (39864) Provided verbal/tactile cueing for activities related to strengthening, flexibility, endurance, ROM for improvements in LE, proximal hip, and core control with self care, mobility, lifting, ambulation.  [] (44259) Provided verbal/tactile cueing for activities related to improving balance, coordination, kinesthetic sense, posture, motor skill, proprioception  to assist with LE, proximal hip, and core control in self care, mobility, lifting, ambulation and eccentric single leg control.      NMR and Therapeutic Activities:    [] (10797 or 07984) Provided verbal/tactile cueing for activities related to improving balance, coordination, kinesthetic sense, posture, motor skill, proprioception and motor activation to allow for proper function of progressing as expected towards functional goals listed. [] Progression is slowed due to complexities/Impairments listed. [] Progression has been slowed due to co-morbidities. [x] Plan just implemented, too soon to assess goals progression <30days   [] Goals require adjustment due to lack of progress  [] Patient is not progressing as expected and requires additional follow up with physician  [] Other    Persisting Functional Limitations/Impairments:  []Sitting [x]Standing   [x]Walking []Stairs   []Transfers [x]ADLs   [x]Squatting/bending []Kneeling  [x]Housework []Job related tasks  []Driving []Sports/Recreation   []Sleeping []Other:    ASSESSMENT:  Patient was able to tolerate increased ankle stab and strengthening exercises today. She was sore and fatigued at end of performing exercise. Pt is still point tender in the peroneals, with edema remaining in the lateral R ankle. Trial K-tape in web design for edema management to R ankle    Treatment/Activity Tolerance:  [x] Pt able to complete treatment [] Patient limited by fatique  [] Patient limited by pain  [] Patient limited by other medical complications  [] Other:     Prognosis:  [x] Good [] Fair  [] Poor    Patient Requires Follow-up: [x] Yes  [] No    Return to Play:    [x]  N/A   []  Stage 1: Intro to Strength   []  Stage 2: Return to Run and Strength   []  Stage 3: Return to Jump and Strength   []  Stage 4: Dynamic Strength and Agility   []  Stage 5: Sport Specific Training     []  Ready to Return to Play, Meets All Above Stages   []  Not Ready for Return to Sports   Comments:            Plan for next treatment session: progress strengthening, assess of benefit of US prn, try K-tape to R ankle    PLAN: See eval. PT 1-2x / week for 6 weeks.    [x] Continue per plan of care [] Alter current plan (see comments)  [] Plan of care initiated [] Hold pending MD visit [] Discharge    Electronically signed by: Destinee Mackay PT, MPT      Note: If patient does

## 2020-01-28 ENCOUNTER — HOSPITAL ENCOUNTER (OUTPATIENT)
Dept: PHYSICAL THERAPY | Age: 45
Setting detail: THERAPIES SERIES
Discharge: HOME | End: 2020-01-28
Payer: MEDICARE

## 2020-01-28 PROCEDURE — 97140 MANUAL THERAPY 1/> REGIONS: CPT | Performed by: PHYSICAL THERAPIST

## 2020-01-28 PROCEDURE — 97035 APP MDLTY 1+ULTRASOUND EA 15: CPT | Performed by: PHYSICAL THERAPIST

## 2020-01-28 PROCEDURE — 97110 THERAPEUTIC EXERCISES: CPT | Performed by: PHYSICAL THERAPIST

## 2020-01-28 NOTE — FLOWSHEET NOTE
Vadim Farooq    Physical Therapy Treatment Note/ Progress Report:     Date:  2020    Patient Name:  Margaux Curiel   [facility] :  1975  MRN: [id number]  Restrictions/Precautions:    Medical/Treatment Diagnosis Information:  · Diagnosis: Inability of B ankle Jts   M25.371, M25.372  · Treatment Diagnosis: R lateral ankle strain, pain, and weakness  Insurance/Certification information:  PT Insurance Information: [employer]  Physician Information:  Referring Practitioner: Petrona Cardenas MD  Plan of care signed (Y/N):     Date of Patient follow up with Physician: not scheduled   Progress Report: []  Yes  [x]  No     Date Range for reporting period:  Beginning 12/10/19  Ending    Progress report due (10 Rx/or 30 days whichever is less):     Recertification due (POC duration/ or 90 days whichever is less):    Visit # Insurance Allowable Auth required? Date Range   7 30 PCY []  Yes  []  No    2 visits used   Latex Allergy:  []NO      []YES  Preferred Language for Healthcare:   [x]English       []other:    Functional Scale: LEFS 39 with disability of 40-59%  Date assessed: 12/10/19    Pain level:  3/10     History:  Patient fell on R ankle 4 years ago resulting in torn tendons, and never had proper treatment. Patient continued to have pain and saw many MD, finally seeing Dr. Abby Rubin. He wants pt to do PT to strengthen her ankle prior to a possible surgery. She is also c/o her L ankle - d/t over compensation.     SUBJECTIVE: Pt reports having a good day today.  She did not notice much difference with K-tape to R ankle    OBJECTIVE: See eval      RESTRICTIONS/PRECAUTIONS: NEEDS  - curtain closed when another male is present    Exercises/Interventions:   R ankle pain with weakness  Therapeutic Exercise (53626)  Resistance / level Sets/sec Reps Notes / Cues   Bike #3 1.5 mph  5'    IB  30\" 2    Foam:  NBOS EC  HR/TR  Squat  march  Hip activation to allow for proper function of core, proximal hip and LE with self care and ADLs  [] (74325) Gait Re-education- Provided training and instruction to the patient for proper LE, core and proximal hip recruitment and positioning and eccentric body weight control with ambulation re-education including up and down stairs     Home Exercise Program:   1/13: standing HR, TR, SLS, squat  12/19: blue TB 4 way ankle  12/10: ankle AROM 4 way and circles, toe curls, seated IN/EV (windhseild wipers), towel calf str, CP. [x] (52757) Reviewed/Progressed HEP activities related to strengthening, flexibility, endurance, ROM of core, proximal hip and LE for functional self-care, mobility, lifting and ambulation/stair navigation   [] (41233)Reviewed/Progressed HEP activities related to improving balance, coordination, kinesthetic sense, posture, motor skill, proprioception of core, proximal hip and LE for self care, mobility, lifting, and ambulation/stair navigation      Manual Treatments:  PROM / STM / Oscillations-Mobs:  G-I, II, III, IV (PA's, Inf., Post.)  [x] (54367) Provided manual therapy to mobilize LE, proximal hip and/or LS spine soft tissue/joints for the purpose of modulating pain, promoting relaxation,  increasing ROM, reducing/eliminating soft tissue swelling/inflammation/restriction, improving soft tissue extensibility and allowing for proper ROM for normal function with self care, mobility, lifting and ambulation. Modalities:  [] (66920) Vasopneumatic compression: Utilized vasopneumatic compression to decrease edema / swelling for the purpose of improving mobility and quad tone / recruitment which will allow for increased overall function including but not limited to self-care, transfers, ambulation, and ascending / descending stairs.        Modalities: 1/28: US @ 100% 1.5w/cm x 5min B lateral ankle with biofreeze, prior to manual    Charges:  Timed Code Treatment Minutes: 60   Total Treatment Minutes: 60

## 2020-01-30 ENCOUNTER — HOSPITAL ENCOUNTER (OUTPATIENT)
Dept: PHYSICAL THERAPY | Age: 45
Setting detail: THERAPIES SERIES
Discharge: HOME | End: 2020-01-30
Payer: MEDICARE

## 2020-02-10 ENCOUNTER — HOSPITAL ENCOUNTER (OUTPATIENT)
Dept: PHYSICAL THERAPY | Age: 45
Setting detail: THERAPIES SERIES
Discharge: HOME | End: 2020-02-10
Payer: MEDICARE

## 2020-02-10 PROCEDURE — 97110 THERAPEUTIC EXERCISES: CPT | Performed by: PHYSICAL THERAPIST

## 2020-02-10 PROCEDURE — 97035 APP MDLTY 1+ULTRASOUND EA 15: CPT | Performed by: PHYSICAL THERAPIST

## 2020-02-10 PROCEDURE — 97530 THERAPEUTIC ACTIVITIES: CPT | Performed by: PHYSICAL THERAPIST

## 2020-02-10 PROCEDURE — 97140 MANUAL THERAPY 1/> REGIONS: CPT | Performed by: PHYSICAL THERAPIST

## 2020-02-10 NOTE — FLOWSHEET NOTE
indicated by patients Functional Deficits. [x]? Progressing: []? Met: []? Not Met: []? Adjusted  4. Patient will return to functional activities including tolerate standing to cook and clean without increased symptoms or restriction. [x]? Progressing: []? Met: []? Not Met: []? Adjusted  5. Overall report 75% improvement in pain and function with WB ADLs  [x]? Progressing: []? Met: []? Not Met: []? Adjusted     Overall Progression Towards Functional goals/ Treatment Progress Update:  [] Patient is progressing as expected towards functional goals listed. [] Progression is slowed due to complexities/Impairments listed. [] Progression has been slowed due to co-morbidities. [x] Plan just implemented, too soon to assess goals progression <30days   [] Goals require adjustment due to lack of progress  [] Patient is not progressing as expected and requires additional follow up with physician  [] Other    Persisting Functional Limitations/Impairments:  []Sitting [x]Standing   [x]Walking []Stairs   []Transfers [x]ADLs   [x]Squatting/bending []Kneeling  [x]Housework []Job related tasks  []Driving []Sports/Recreation   []Sleeping []Other:    ASSESSMENT:  Patient was able to tolerate increased ankle stab and strengthening exercises today. Pt only gets ST relief after PT. See measurements above - her AROM and strength have improved, malcom in the R ankle.     Treatment/Activity Tolerance:  [x] Pt able to complete treatment [] Patient limited by fatique  [] Patient limited by pain  [] Patient limited by other medical complications  [] Other:     Prognosis:  [x] Good [] Fair  [] Poor    Patient Requires Follow-up: [x] Yes  [] No    Return to Play:    [x]  N/A   []  Stage 1: Intro to Strength   []  Stage 2: Return to Run and Strength   []  Stage 3: Return to Jump and Strength   []  Stage 4: Dynamic Strength and Agility   []  Stage 5: Sport Specific Training     []  Ready to Return to Play, Meets All Above Stages   []  Not Ready

## 2020-02-17 ENCOUNTER — HOSPITAL ENCOUNTER (OUTPATIENT)
Dept: WOMENS IMAGING | Age: 45
Discharge: HOME | End: 2020-02-17
Payer: MEDICARE

## 2020-02-17 PROCEDURE — 77063 BREAST TOMOSYNTHESIS BI: CPT

## 2020-08-27 ENCOUNTER — OFFICE VISIT (OUTPATIENT)
Dept: PRIMARY CARE CLINIC | Age: 45
End: 2020-08-27
Payer: MEDICARE

## 2020-08-27 VITALS
BODY MASS INDEX: 37.91 KG/M2 | SYSTOLIC BLOOD PRESSURE: 120 MMHG | HEART RATE: 85 BPM | RESPIRATION RATE: 16 BRPM | TEMPERATURE: 98.2 F | OXYGEN SATURATION: 99 % | DIASTOLIC BLOOD PRESSURE: 80 MMHG | WEIGHT: 214 LBS

## 2020-08-27 PROBLEM — E66.01 MORBIDLY OBESE (HCC): Status: ACTIVE | Noted: 2020-08-27

## 2020-08-27 PROCEDURE — G8417 CALC BMI ABV UP PARAM F/U: HCPCS | Performed by: FAMILY MEDICINE

## 2020-08-27 PROCEDURE — G8427 DOCREV CUR MEDS BY ELIG CLIN: HCPCS | Performed by: FAMILY MEDICINE

## 2020-08-27 PROCEDURE — 99214 OFFICE O/P EST MOD 30 MIN: CPT | Performed by: FAMILY MEDICINE

## 2020-08-27 PROCEDURE — 1036F TOBACCO NON-USER: CPT | Performed by: FAMILY MEDICINE

## 2020-08-27 PROCEDURE — 93000 ELECTROCARDIOGRAM COMPLETE: CPT | Performed by: FAMILY MEDICINE

## 2020-08-27 SDOH — ECONOMIC STABILITY: FOOD INSECURITY: WITHIN THE PAST 12 MONTHS, YOU WORRIED THAT YOUR FOOD WOULD RUN OUT BEFORE YOU GOT MONEY TO BUY MORE.: NEVER TRUE

## 2020-08-27 SDOH — ECONOMIC STABILITY: FOOD INSECURITY: WITHIN THE PAST 12 MONTHS, THE FOOD YOU BOUGHT JUST DIDN'T LAST AND YOU DIDN'T HAVE MONEY TO GET MORE.: NEVER TRUE

## 2020-08-27 SDOH — ECONOMIC STABILITY: INCOME INSECURITY: HOW HARD IS IT FOR YOU TO PAY FOR THE VERY BASICS LIKE FOOD, HOUSING, MEDICAL CARE, AND HEATING?: NOT VERY HARD

## 2020-08-27 SDOH — ECONOMIC STABILITY: TRANSPORTATION INSECURITY
IN THE PAST 12 MONTHS, HAS THE LACK OF TRANSPORTATION KEPT YOU FROM MEDICAL APPOINTMENTS OR FROM GETTING MEDICATIONS?: NO

## 2020-08-27 SDOH — ECONOMIC STABILITY: TRANSPORTATION INSECURITY
IN THE PAST 12 MONTHS, HAS LACK OF TRANSPORTATION KEPT YOU FROM MEETINGS, WORK, OR FROM GETTING THINGS NEEDED FOR DAILY LIVING?: NO

## 2020-08-27 ASSESSMENT — PATIENT HEALTH QUESTIONNAIRE - PHQ9
SUM OF ALL RESPONSES TO PHQ9 QUESTIONS 1 & 2: 0
2. FEELING DOWN, DEPRESSED OR HOPELESS: 0
1. LITTLE INTEREST OR PLEASURE IN DOING THINGS: 0
SUM OF ALL RESPONSES TO PHQ QUESTIONS 1-9: 0
SUM OF ALL RESPONSES TO PHQ QUESTIONS 1-9: 0

## 2020-08-27 NOTE — PROGRESS NOTES
SUBJECTIVE:  Patient ID: Kassandra Medina is a 50 y.o. y.o. female     HPI   Chest Pain: Patient complains of chest pain. Onset was few  months ago, with worsening course since that time. The patient describes the pain as intermittent, sharp in nature, does not radiate. Patient rates pain as a 4/10 in intensity. Associated symptoms are chest pain. Aggravating factors are emotional stress. Alleviating factors are: none. Patient's cardiac risk factors are none. Patient's risk factors for DVT/PE: none. Previous cardiac testing: none. Patient describes her feeling burning in her chest when she is very stressed she is worried about her boys 1 of them with major depression disorder and multiple medication the other one she thinks he is losing weight because he cannot swallow he was seen by me today but he did not mention that so I told her he needs to do that he is a 24years old  She had a history in the past abnormal blood glucose she has not done fasting blood work for a while she is not fasting today she will be back fasting    GERD: Nestor complains of heartburn. This has been associated with heartburn. She denies no other symptoms. Symptoms have been present for a few months. She denies dysphagia. She has not lost weight. She denies melena, hematochezia, hematemesis, and coffee ground emesis. Medical therapy in the past has included none.       Past Medical History:   Diagnosis Date    Ankle pain     had fall     Hypothyroidism     Urolithiasis     Uterus disorder       Past Surgical History:   Procedure Laterality Date     SECTION      Two C sections    UTERINE FIBROID SURGERY       Family History   Problem Relation Age of Onset    Breast Cancer Mother 39    Diabetes Father      Social History     Socioeconomic History    Marital status:      Spouse name: None    Number of children: 11    Years of education: None    Highest education level: None   Occupational History    Occupation: Unemployed   Social Needs    Financial resource strain: Not very hard    Food insecurity     Worry: Never true     Inability: Never true    Transportation needs     Medical: No     Non-medical: No   Tobacco Use    Smoking status: Passive Smoke Exposure - Never Smoker    Smokeless tobacco: Never Used   Substance and Sexual Activity    Alcohol use: No    Drug use: No    Sexual activity: Yes     Partners: Male   Lifestyle    Physical activity     Days per week: None     Minutes per session: None    Stress: None   Relationships    Social connections     Talks on phone: None     Gets together: None     Attends Taoism service: None     Active member of club or organization: None     Attends meetings of clubs or organizations: None     Relationship status: None    Intimate partner violence     Fear of current or ex partner: None     Emotionally abused: None     Physically abused: None     Forced sexual activity: None   Other Topics Concern    None   Social History Narrative    None     Current Outpatient Medications   Medication Sig Dispense Refill    naproxen (NAPROSYN) 500 MG tablet Take 1 tablet by mouth 2 times daily (with meals) 60 tablet 0    Misc. Devices MISC L-XL compression stockings of patient's choice. Please provide two pairs. Dx: dependent edema. (Patient not taking: Reported on 8/27/2020) 1 Device 1    vitamin D (ERGOCALCIFEROL) 77433 units CAPS capsule Take 1 capsule by mouth once a week (Patient not taking: Reported on 8/27/2020) 20 capsule 0     No current facility-administered medications for this visit. Allergies   Allergen Reactions    Nsaids        Review of Systems   Constitutional: Negative. HENT: Negative. Eyes: Negative. Respiratory: Negative. Cardiovascular: Positive for chest pain. Gastrointestinal: Negative. All other systems reviewed and are negative.       OBJECTIVE:  /80 (Site: Left Upper Arm, Position: Sitting, Cuff Size: Medium Adult) Pulse 85   Temp 98.2 °F (36.8 °C) (Skin)   Resp 16   Wt 214 lb (97.1 kg)   SpO2 99%   BMI 37.91 kg/m²     Physical Exam  Vitals signs reviewed. HENT:      Head: Normocephalic and atraumatic. Eyes:      General: No scleral icterus. Conjunctiva/sclera: Conjunctivae normal.   Neck:      Thyroid: No thyromegaly. Vascular: No JVD. Cardiovascular:      Rate and Rhythm: Normal rate and regular rhythm. Heart sounds: Normal heart sounds. No murmur. No friction rub. No gallop. Pulmonary:      Effort: Pulmonary effort is normal.      Breath sounds: Normal breath sounds. No wheezing or rales. Abdominal:      General: Bowel sounds are normal. There is no distension. Palpations: Abdomen is soft. There is no mass. Tenderness: There is no abdominal tenderness. Hernia: No hernia is present. Lymphadenopathy:      Cervical: No cervical adenopathy. Skin:     Findings: No rash. Neurological:      Mental Status: She is alert and oriented to person, place, and time. ASSESSMENT:     Diagnosis Orders   1. Hyperglycemia  TSH without Reflex    CBC    Basic Metabolic Panel    Hemoglobin A1C   2.  Morbidly obese (HCC)     3. Chest pain, unspecified type  EKG 12 lead    EKG 12 lead       PLAN:  See orders  Discussed healthy lifestyle  Discussed possible anxiety I offered referral to see psychology or put on medicine she refuses both  I discussed with the patient in the presence of the  how important for her son to mention those sees a patient of mine I cannot just rely a message from his mom or she has to be present with him in the room at the time of his visit

## 2020-08-28 ASSESSMENT — ENCOUNTER SYMPTOMS
EYES NEGATIVE: 1
GASTROINTESTINAL NEGATIVE: 1
RESPIRATORY NEGATIVE: 1

## 2020-08-31 DIAGNOSIS — R73.9 HYPERGLYCEMIA: ICD-10-CM

## 2020-08-31 LAB
ANION GAP SERPL CALCULATED.3IONS-SCNC: 11 MMOL/L (ref 3–16)
BUN BLDV-MCNC: 16 MG/DL (ref 7–20)
CALCIUM SERPL-MCNC: 8.7 MG/DL (ref 8.3–10.6)
CHLORIDE BLD-SCNC: 107 MMOL/L (ref 99–110)
CO2: 21 MMOL/L (ref 21–32)
CREAT SERPL-MCNC: 0.6 MG/DL (ref 0.6–1.1)
GFR AFRICAN AMERICAN: >60
GFR NON-AFRICAN AMERICAN: >60
GLUCOSE BLD-MCNC: 139 MG/DL (ref 70–99)
HCT VFR BLD CALC: 39.4 % (ref 36–48)
HEMOGLOBIN: 13 G/DL (ref 12–16)
MCH RBC QN AUTO: 29.4 PG (ref 26–34)
MCHC RBC AUTO-ENTMCNC: 33 G/DL (ref 31–36)
MCV RBC AUTO: 89.3 FL (ref 80–100)
PDW BLD-RTO: 13.5 % (ref 12.4–15.4)
PLATELET # BLD: 235 K/UL (ref 135–450)
PMV BLD AUTO: 9.1 FL (ref 5–10.5)
POTASSIUM SERPL-SCNC: 4.2 MMOL/L (ref 3.5–5.1)
RBC # BLD: 4.41 M/UL (ref 4–5.2)
SODIUM BLD-SCNC: 139 MMOL/L (ref 136–145)
TSH SERPL DL<=0.05 MIU/L-ACNC: 1.75 UIU/ML (ref 0.27–4.2)
WBC # BLD: 4.9 K/UL (ref 4–11)

## 2020-09-01 ENCOUNTER — TELEPHONE (OUTPATIENT)
Dept: PRIMARY CARE CLINIC | Age: 45
End: 2020-09-01

## 2020-09-01 LAB
ESTIMATED AVERAGE GLUCOSE: 134.1 MG/DL
HBA1C MFR BLD: 6.3 %

## 2020-09-01 NOTE — TELEPHONE ENCOUNTER
Pt's son is calling to receive the results of his mother's lab work results. He is on her HIPAA form.     LOV 8/27/20

## 2021-01-12 ENCOUNTER — VIRTUAL VISIT (OUTPATIENT)
Dept: PRIMARY CARE CLINIC | Age: 46
End: 2021-01-12
Payer: MEDICARE

## 2021-01-12 DIAGNOSIS — R10.84 GENERALIZED ABDOMINAL PAIN: ICD-10-CM

## 2021-01-12 DIAGNOSIS — R73.9 HYPERGLYCEMIA: Primary | ICD-10-CM

## 2021-01-12 PROCEDURE — 1036F TOBACCO NON-USER: CPT | Performed by: FAMILY MEDICINE

## 2021-01-12 PROCEDURE — G8417 CALC BMI ABV UP PARAM F/U: HCPCS | Performed by: FAMILY MEDICINE

## 2021-01-12 PROCEDURE — G8484 FLU IMMUNIZE NO ADMIN: HCPCS | Performed by: FAMILY MEDICINE

## 2021-01-12 PROCEDURE — G8427 DOCREV CUR MEDS BY ELIG CLIN: HCPCS | Performed by: FAMILY MEDICINE

## 2021-01-12 PROCEDURE — 99213 OFFICE O/P EST LOW 20 MIN: CPT | Performed by: FAMILY MEDICINE

## 2021-01-12 RX ORDER — BLOOD-GLUCOSE METER
1 KIT MISCELLANEOUS DAILY
Qty: 1 KIT | Refills: 0 | Status: SHIPPED | OUTPATIENT
Start: 2021-01-12 | End: 2021-02-05

## 2021-01-12 RX ORDER — PIOGLITAZONEHYDROCHLORIDE 15 MG/1
15 TABLET ORAL DAILY
Qty: 30 TABLET | Refills: 3 | Status: SHIPPED | OUTPATIENT
Start: 2021-01-12 | End: 2021-05-24 | Stop reason: SDUPTHER

## 2021-01-12 ASSESSMENT — ENCOUNTER SYMPTOMS
VOMITING: 0
RECTAL PAIN: 0
ANAL BLEEDING: 0
BLOOD IN STOOL: 0
EYES NEGATIVE: 1
RESPIRATORY NEGATIVE: 1
CONSTIPATION: 1
DIARRHEA: 0
ABDOMINAL PAIN: 1
NAUSEA: 0

## 2021-01-12 NOTE — PROGRESS NOTES
2021    TELEHEALTH EVALUATION -- Audio/Visual (During UFNKB-68 public health emergency)    HPI:    Mike Glover (:  1975) has requested an audio/video evaluation for the following concern(s):  Pt  with abnormal blood glucose was tested back in August, I put her on Metformin per the patient her constipation got worse she felt she gained weight on it she has not been checking her blood glucose at home  She struggled with constipation on and off for years but seems to get worse lately with the medication  She been struggling with abdominal pain per her increased frequency in urination no burning in some pain on the left side advised the patient for that she needs to be seen in the office      Review of Systems   Constitutional: Negative. HENT: Negative. Eyes: Negative. Respiratory: Negative. Cardiovascular: Negative. Gastrointestinal: Positive for abdominal pain and constipation. Negative for anal bleeding, blood in stool, diarrhea, nausea, rectal pain and vomiting. All other systems reviewed and are negative. Prior to Visit Medications    Medication Sig Taking? Authorizing Provider   metFORMIN (GLUCOPHAGE) 500 MG tablet Take 1 tablet by mouth daily (with breakfast) Yes Gabriel Downing MD   Misc. Devices MISC L-XL compression stockings of patient's choice. Please provide two pairs. Dx: dependent edema.  Yes Sebastian Foster MD   naproxen (NAPROSYN) 500 MG tablet Take 1 tablet by mouth 2 times daily (with meals)  Jose Armando Klein MD   vitamin D (ERGOCALCIFEROL) 55473 units CAPS capsule Take 1 capsule by mouth once a week  Patient not taking: Reported on 2020  Sebastian Foster MD       Social History     Tobacco Use    Smoking status: Passive Smoke Exposure - Never Smoker    Smokeless tobacco: Never Used   Substance Use Topics    Alcohol use: No    Drug use: No        Allergies   Allergen Reactions    Nsaids    ,   Past Medical History:   Diagnosis Date    Ankle pain     had fall  Hypothyroidism     Urolithiasis     Uterus disorder    ,   Past Surgical History:   Procedure Laterality Date     SECTION      Two C sections    UTERINE FIBROID SURGERY     ,   Social History     Tobacco Use    Smoking status: Passive Smoke Exposure - Never Smoker    Smokeless tobacco: Never Used   Substance Use Topics    Alcohol use: No    Drug use: No   ,   Family History   Problem Relation Age of Onset    Breast Cancer Mother 39    Diabetes Father    ,   Immunization History   Administered Date(s) Administered    Hepatitis B 10/03/2016, 2016    Influenza Vaccine, unspecified formulation 10/03/2016    MMR 10/03/2016    Tdap (Boostrix, Adacel) 10/03/2016   ,   Health Maintenance   Topic Date Due    Hepatitis C screen  1975    Cervical cancer screen  1994    Flu vaccine (1) 2020    Breast cancer screen  2021    A1C test (Diabetic or Prediabetic)  2021    Lipid screen  2024    DTaP/Tdap/Td vaccine (2 - Td) 10/03/2026    HIV screen  Completed    Hepatitis A vaccine  Aged Out    Hepatitis B vaccine  Aged Out    Hib vaccine  Aged Out    Meningococcal (ACWY) vaccine  Aged Out    Pneumococcal 0-64 years Vaccine  Aged Out       PHYSICAL EXAMINATION:  [ INSTRUCTIONS:  \"[x]\" Indicates a positive item  \"[]\" Indicates a negative item  -- DELETE ALL ITEMS NOT EXAMINED]  Vital Signs: (As obtained by patient/caregiver or practitioner observation)    Blood pressure-  Heart rate-    Respiratory rate-    Temperature-  Pulse oximetry-     Constitutional: [x] Appears well-developed and well-nourished [x] No apparent distress      [] Abnormal-   Mental status  [x] Alert and awake  [x] Oriented to person/place/time []Able to follow commands      Eyes:  EOM    [x]  Normal  [] Abnormal-  Sclera  [x]  Normal  [] Abnormal -         Discharge [x]  None visible  [] Abnormal -    HENT:   [x] Normocephalic, atraumatic.   [] Abnormal Arcadio He is a 50 y.o. female being evaluated by a Virtual Visit (video visit) encounter to address concerns as mentioned above. A caregiver was present when appropriate. Due to this being a TeleHealth encounter (During IUE-94 public health emergency), evaluation of the following organ systems was limited: Vitals/Constitutional/EENT/Resp/CV/GI//MS/Neuro/Skin/Heme-Lymph-Imm. Pursuant to the emergency declaration under the [address] [place] [address] and the [employer] and [employer] Act, this Virtual Visit was conducted with patient's (and/or legal guardian's) consent, to reduce the patient's risk of exposure to COVID-19 and provide necessary medical care. The patient (and/or legal guardian) has also been advised to contact this office for worsening conditions or problems, and seek emergency medical treatment and/or call 911 if deemed necessary. Patient identification was verified at the start of the visit: Yes    Total time spent on this encounter: Not billed by time    Services were provided through a video synchronous discussion virtually to substitute for in-person clinic visit. Patient and provider were located at their individual homes. --Orquidea Winter MD on 1/12/2021 at 4:35 PM    An electronic signature was used to authenticate this note.

## 2021-02-05 ENCOUNTER — OFFICE VISIT (OUTPATIENT)
Dept: PRIMARY CARE CLINIC | Age: 46
End: 2021-02-05
Payer: MEDICARE

## 2021-02-05 VITALS
DIASTOLIC BLOOD PRESSURE: 78 MMHG | OXYGEN SATURATION: 99 % | HEART RATE: 81 BPM | BODY MASS INDEX: 37.55 KG/M2 | SYSTOLIC BLOOD PRESSURE: 118 MMHG | RESPIRATION RATE: 14 BRPM | WEIGHT: 212 LBS | TEMPERATURE: 98.1 F

## 2021-02-05 DIAGNOSIS — G89.29 CHRONIC BILATERAL THORACIC BACK PAIN: Primary | ICD-10-CM

## 2021-02-05 DIAGNOSIS — M54.6 CHRONIC BILATERAL THORACIC BACK PAIN: ICD-10-CM

## 2021-02-05 DIAGNOSIS — G89.29 CHRONIC BILATERAL THORACIC BACK PAIN: ICD-10-CM

## 2021-02-05 DIAGNOSIS — K59.00 CONSTIPATION, UNSPECIFIED CONSTIPATION TYPE: ICD-10-CM

## 2021-02-05 DIAGNOSIS — M54.6 CHRONIC BILATERAL THORACIC BACK PAIN: Primary | ICD-10-CM

## 2021-02-05 DIAGNOSIS — R73.9 HYPERGLYCEMIA: ICD-10-CM

## 2021-02-05 LAB
ANION GAP SERPL CALCULATED.3IONS-SCNC: 13 MMOL/L (ref 3–16)
BILIRUBIN, POC: NEGATIVE
BLOOD URINE, POC: NORMAL
BUN BLDV-MCNC: 18 MG/DL (ref 7–20)
CALCIUM SERPL-MCNC: 8.7 MG/DL (ref 8.3–10.6)
CHLORIDE BLD-SCNC: 103 MMOL/L (ref 99–110)
CLARITY, POC: NORMAL
CO2: 23 MMOL/L (ref 21–32)
COLOR, POC: YELLOW
CREAT SERPL-MCNC: <0.5 MG/DL (ref 0.6–1.1)
GFR AFRICAN AMERICAN: >60
GFR NON-AFRICAN AMERICAN: >60
GLUCOSE BLD-MCNC: 108 MG/DL (ref 70–99)
GLUCOSE URINE, POC: NEGATIVE
HCT VFR BLD CALC: 38.6 % (ref 36–48)
HEMOGLOBIN: 12.6 G/DL (ref 12–16)
KETONES, POC: NEGATIVE
LEUKOCYTE EST, POC: NEGATIVE
MCH RBC QN AUTO: 29 PG (ref 26–34)
MCHC RBC AUTO-ENTMCNC: 32.7 G/DL (ref 31–36)
MCV RBC AUTO: 88.8 FL (ref 80–100)
NITRITE, POC: NEGATIVE
PDW BLD-RTO: 13.8 % (ref 12.4–15.4)
PH, POC: 5.5
PLATELET # BLD: 217 K/UL (ref 135–450)
PMV BLD AUTO: 8.7 FL (ref 5–10.5)
POTASSIUM SERPL-SCNC: 4 MMOL/L (ref 3.5–5.1)
PROTEIN, POC: NEGATIVE
RBC # BLD: 4.35 M/UL (ref 4–5.2)
SODIUM BLD-SCNC: 139 MMOL/L (ref 136–145)
SPECIFIC GRAVITY, POC: 1
UROBILINOGEN, POC: 0.2
WBC # BLD: 5.9 K/UL (ref 4–11)

## 2021-02-05 PROCEDURE — G8484 FLU IMMUNIZE NO ADMIN: HCPCS | Performed by: FAMILY MEDICINE

## 2021-02-05 PROCEDURE — G8427 DOCREV CUR MEDS BY ELIG CLIN: HCPCS | Performed by: FAMILY MEDICINE

## 2021-02-05 PROCEDURE — 99214 OFFICE O/P EST MOD 30 MIN: CPT | Performed by: FAMILY MEDICINE

## 2021-02-05 PROCEDURE — G8417 CALC BMI ABV UP PARAM F/U: HCPCS | Performed by: FAMILY MEDICINE

## 2021-02-05 PROCEDURE — 1036F TOBACCO NON-USER: CPT | Performed by: FAMILY MEDICINE

## 2021-02-05 ASSESSMENT — ENCOUNTER SYMPTOMS
BACK PAIN: 1
RESPIRATORY NEGATIVE: 1
GASTROINTESTINAL NEGATIVE: 1
EYES NEGATIVE: 1

## 2021-02-05 ASSESSMENT — PATIENT HEALTH QUESTIONNAIRE - PHQ9
1. LITTLE INTEREST OR PLEASURE IN DOING THINGS: 0
SUM OF ALL RESPONSES TO PHQ QUESTIONS 1-9: 0
SUM OF ALL RESPONSES TO PHQ QUESTIONS 1-9: 0

## 2021-02-05 NOTE — PROGRESS NOTES
SUBJECTIVE:  Patient ID: Herminio Fish is a 50 y.o. y.o. female     HPI   Back Pain: Patient presents for presents evaluation of low back problems. Symptoms have been present for several months and include pain in mid back  (aching and throbbing in character; 5/10 in severity). Initial inciting event: none. Symptoms are worst: morning, mid-day. Alleviating factors identifiable by patient are none. Exacerbating factors identifiable by patient are bending backwards, bending forwards and bending sideways. Treatments so far initiated by patient: none Previous lower back problems: none. Previous workup: none. Previous treatments: none.   Pain in the mid back radiate down to her lower abdomen feel pressure at the CVA bilaterally  Patient with hyperglycemia on Metformin she thinks that is aggravating her travel constipation which is unusual side effect her son and pharmacy school and explained that to her   present with them  She has tried some over-the-counter product to help with constipation nothing had helped she has not tried milk of magnesia  Past Medical History:   Diagnosis Date    Ankle pain     had fall     Hypothyroidism     Urolithiasis     Uterus disorder       Past Surgical History:   Procedure Laterality Date     SECTION      Two C sections    UTERINE FIBROID SURGERY       Family History   Problem Relation Age of Onset    Breast Cancer Mother 39    Diabetes Father      Social History     Socioeconomic History    Marital status:      Spouse name: Not on file    Number of children: 11    Years of education: Not on file    Highest education level: Not on file   Occupational History    Occupation: Unemployed   Social Needs    Financial resource strain: Not very hard    Food insecurity     Worry: Never true     Inability: Never true    Transportation needs     Medical: No     Non-medical: No   Tobacco Use    Smoking status: Passive Smoke Exposure - Never Smoker  Smokeless tobacco: Never Used   Substance and Sexual Activity    Alcohol use: No    Drug use: No    Sexual activity: Yes     Partners: Male   Lifestyle    Physical activity     Days per week: Not on file     Minutes per session: Not on file    Stress: Not on file   Relationships    Social connections     Talks on phone: Not on file     Gets together: Not on file     Attends Mormonism service: Not on file     Active member of club or organization: Not on file     Attends meetings of clubs or organizations: Not on file     Relationship status: Not on file    Intimate partner violence     Fear of current or ex partner: Not on file     Emotionally abused: Not on file     Physically abused: Not on file     Forced sexual activity: Not on file   Other Topics Concern    Not on file   Social History Narrative    Not on file     Current Outpatient Medications   Medication Sig Dispense Refill    pioglitazone (ACTOS) 15 MG tablet Take 1 tablet by mouth daily 30 tablet 3    Misc. Devices MISC L-XL compression stockings of patient's choice. Please provide two pairs. Dx: dependent edema. 1 Device 1    glucose monitoring kit (FREESTYLE) monitoring kit 1 kit by Does not apply route daily (Patient not taking: Reported on 2/5/2021) 1 kit 0    metFORMIN (GLUCOPHAGE) 500 MG tablet Take 1 tablet by mouth daily (with breakfast) (Patient not taking: Reported on 2/5/2021) 30 tablet 2    naproxen (NAPROSYN) 500 MG tablet Take 1 tablet by mouth 2 times daily (with meals) 60 tablet 0    vitamin D (ERGOCALCIFEROL) 07237 units CAPS capsule Take 1 capsule by mouth once a week (Patient not taking: Reported on 8/27/2020) 20 capsule 0     No current facility-administered medications for this visit. Allergies   Allergen Reactions    Nsaids        Review of Systems   Constitutional: Negative. HENT: Negative. Eyes: Negative. Respiratory: Negative. Cardiovascular: Negative. Gastrointestinal: Negative. Genitourinary: Positive for flank pain and frequency. Musculoskeletal: Positive for arthralgias and back pain. All other systems reviewed and are negative. OBJECTIVE:  /78 (Site: Left Upper Arm, Position: Sitting, Cuff Size: Medium Adult)   Pulse 81   Temp 98.1 °F (36.7 °C) (Skin)   Resp 14   Wt 212 lb (96.2 kg)   SpO2 99%   BMI 37.55 kg/m²     Physical Exam  Vitals signs reviewed. Constitutional:       Appearance: Normal appearance. HENT:      Head: Normocephalic and atraumatic. Eyes:      General: No scleral icterus. Conjunctiva/sclera: Conjunctivae normal.   Neck:      Thyroid: No thyromegaly. Vascular: No JVD. Cardiovascular:      Rate and Rhythm: Normal rate and regular rhythm. Heart sounds: Normal heart sounds. No murmur. No friction rub. No gallop. Pulmonary:      Effort: Pulmonary effort is normal.      Breath sounds: Normal breath sounds. No wheezing or rales. Abdominal:      General: Bowel sounds are normal. There is no distension. Palpations: Abdomen is soft. There is no mass. Tenderness: There is no abdominal tenderness. Hernia: No hernia is present. Musculoskeletal:      Lumbar back: She exhibits tenderness and pain. She exhibits no bony tenderness, no swelling, no edema, no deformity, no laceration, no spasm and normal pulse. Back:    Lymphadenopathy:      Cervical: No cervical adenopathy. Skin:     Findings: No rash. Neurological:      Mental Status: She is alert and oriented to person, place, and time. ASSESSMENT:   Diagnosis Orders   1. Chronic bilateral thoracic back pain  CBC    Basic Metabolic Panel    POCT Urinalysis no Micro    US RENAL LIMITED    Culture, Urine   2. Hyperglycemia     3.  Constipation, unspecified constipation type         PLAN:  See orders  Encourage the patient to stay on Metformin  Increase fiber/milk of magnesia something she can try if not better let me know may need a GI work-up

## 2021-02-07 LAB — URINE CULTURE, ROUTINE: NORMAL

## 2021-02-08 ENCOUNTER — HOSPITAL ENCOUNTER (OUTPATIENT)
Dept: ULTRASOUND IMAGING | Age: 46
Discharge: HOME | End: 2021-02-08
Payer: MEDICARE

## 2021-02-08 DIAGNOSIS — M54.6 CHRONIC BILATERAL THORACIC BACK PAIN: ICD-10-CM

## 2021-02-08 DIAGNOSIS — G89.29 CHRONIC BILATERAL THORACIC BACK PAIN: ICD-10-CM

## 2021-02-08 PROCEDURE — 76770 US EXAM ABDO BACK WALL COMP: CPT

## 2021-02-09 DIAGNOSIS — R93.5 ABNORMAL US (ULTRASOUND) OF ABDOMEN: Primary | ICD-10-CM

## 2021-02-16 ENCOUNTER — PATIENT MESSAGE (OUTPATIENT)
Dept: PRIMARY CARE CLINIC | Age: 46
End: 2021-02-16

## 2021-02-16 NOTE — TELEPHONE ENCOUNTER
From: Arcadio He  To: Orquidea Winter MD  Sent: 2/16/2021 2:07 PM EST  Subject: Visit Follow-Up Question    This message is being sent by Jaswinder Murray on behalf of Arcadio He. Maddison Doctor,    I have question please whats the results on the [facility] imagine is there any problem ? I've take appointment with you on monday. I have question about the Medication should i take it again ? I have a bottle of Pioglitazone.     Thank you Doctor

## 2021-02-22 ENCOUNTER — VIRTUAL VISIT (OUTPATIENT)
Dept: PRIMARY CARE CLINIC | Age: 46
End: 2021-02-22
Payer: MEDICARE

## 2021-02-22 DIAGNOSIS — R10.84 GENERALIZED ABDOMINAL PAIN: Primary | ICD-10-CM

## 2021-02-22 DIAGNOSIS — E11.9 TYPE 2 DIABETES MELLITUS WITHOUT COMPLICATION, WITHOUT LONG-TERM CURRENT USE OF INSULIN (HCC): ICD-10-CM

## 2021-02-22 PROCEDURE — 99213 OFFICE O/P EST LOW 20 MIN: CPT | Performed by: FAMILY MEDICINE

## 2021-02-22 PROCEDURE — G8417 CALC BMI ABV UP PARAM F/U: HCPCS | Performed by: FAMILY MEDICINE

## 2021-02-22 PROCEDURE — G8427 DOCREV CUR MEDS BY ELIG CLIN: HCPCS | Performed by: FAMILY MEDICINE

## 2021-02-22 PROCEDURE — 2022F DILAT RTA XM EVC RTNOPTHY: CPT | Performed by: FAMILY MEDICINE

## 2021-02-22 PROCEDURE — 3046F HEMOGLOBIN A1C LEVEL >9.0%: CPT | Performed by: FAMILY MEDICINE

## 2021-02-22 PROCEDURE — G8484 FLU IMMUNIZE NO ADMIN: HCPCS | Performed by: FAMILY MEDICINE

## 2021-02-22 PROCEDURE — 1036F TOBACCO NON-USER: CPT | Performed by: FAMILY MEDICINE

## 2021-02-22 NOTE — PROGRESS NOTES
2021    TELEHEALTH EVALUATION -- Audio/Visual (During HIS-35 public health emergency)    HPI:    Quinten Ornelas (:  1975) has requested an audio/video evaluation for the following concern(s):    Patient for follow-up and recent renal ultrasound which shows some changes in the bladder, she still experience on and off pain I have ordered a CT scan of the abdomen pelvis he has not done it yet. We stopped metformin started pioglitazone for a month and she stopped it I told her she needs to take it every day she will continue to take it every day  Overall she is doing okay she has no concern other  Reviewed the recent ultrasound    Review of Systems   Constitutional: Negative. HENT: Negative. Eyes: Negative. Respiratory: Negative. Cardiovascular: Negative. Gastrointestinal: Positive for abdominal pain. Negative for abdominal distention, anal bleeding, blood in stool, constipation, diarrhea, nausea, rectal pain and vomiting. All other systems reviewed and are negative. Prior to Visit Medications    Medication Sig Taking? Authorizing Provider   pioglitazone (ACTOS) 15 MG tablet Take 1 tablet by mouth daily Yes Dudley Nettles MD   Misc. Devices MISC L-XL compression stockings of patient's choice. Please provide two pairs. Dx: dependent edema.  Yes Ria Patel MD   naproxen (NAPROSYN) 500 MG tablet Take 1 tablet by mouth 2 times daily (with meals)  Zachary Foster MD       Social History     Tobacco Use    Smoking status: Passive Smoke Exposure - Never Smoker    Smokeless tobacco: Never Used   Substance Use Topics    Alcohol use: No    Drug use: No        Allergies   Allergen Reactions    Nsaids    ,   Past Medical History:   Diagnosis Date    Ankle pain     had fall     Hypothyroidism     Urolithiasis     Uterus disorder    ,   Past Surgical History:   Procedure Laterality Date     SECTION      Two C sections    UTERINE FIBROID SURGERY     ,   Social History Tobacco Use    Smoking status: Passive Smoke Exposure - Never Smoker    Smokeless tobacco: Never Used   Substance Use Topics    Alcohol use: No    Drug use: No   ,   Family History   Problem Relation Age of Onset    Breast Cancer Mother 39    Diabetes Father    ,   Immunization History   Administered Date(s) Administered    Hepatitis B 10/03/2016, 11/22/2016    Influenza Vaccine, unspecified formulation 10/03/2016    MMR 10/03/2016    Tdap (Boostrix, Adacel) 10/03/2016   ,   Health Maintenance   Topic Date Due    Hepatitis C screen  1975    Cervical cancer screen  06/26/1994    Flu vaccine (1) 09/01/2020    Breast cancer screen  02/17/2021    A1C test (Diabetic or Prediabetic)  08/31/2021    Lipid screen  12/02/2024    DTaP/Tdap/Td vaccine (2 - Td) 10/03/2026    HIV screen  Completed    Hepatitis A vaccine  Aged Out    Hepatitis B vaccine  Aged Out    Hib vaccine  Aged Out    Meningococcal (ACWY) vaccine  Aged Out    Pneumococcal 0-64 years Vaccine  Aged Out       PHYSICAL EXAMINATION:  [ INSTRUCTIONS:  \"[x]\" Indicates a positive item  \"[]\" Indicates a negative item  -- DELETE ALL ITEMS NOT EXAMINED]  Vital Signs: (As obtained by patient/caregiver or practitioner observation)    Blood pressure-  Heart rate-    Respiratory rate-    Temperature-  Pulse oximetry-     Constitutional: [x] Appears well-developed and well-nourished [] No apparent distress      [] Abnormal-   Mental status  [x] Alert and awake  [x] Oriented to person/place/time []Able to follow commands      Eyes:  EOM    [x]  Normal  [] Abnormal-  Sclera  [x]  Normal  [] Abnormal -         Discharge [x]  None visible  [] Abnormal -    HENT:   [x] Normocephalic, atraumatic.   [] Abnormal   [x] Mouth/Throat: Mucous membranes are moist.     External Ears [] Normal  [] Abnormal-     Neck: [x] No visualized mass Pulmonary/Chest: [x] Respiratory effort normal.  [] No visualized signs of difficulty breathing or respiratory distress        [] Abnormal-      Musculoskeletal:   [] Normal gait with no signs of ataxia         [] Normal range of motion of neck        [] Abnormal-       Neurological:        [x] No Facial Asymmetry (Cranial nerve 7 motor function) (limited exam to video visit)          [x] No gaze palsy        [] Abnormal-         Skin:        [x] No significant exanthematous lesions or discoloration noted on facial skin         [] Abnormal-            Psychiatric:       [x] Normal Affect [] No Hallucinations        [] Abnormal-     Other pertinent observable physical exam findings-     ASSESSMENT/PLAN:   Diagnosis Orders   1. Generalized abdominal pain     2.  Type 2 diabetes mellitus without complication, without long-term current use of insulin (HCC)     Recent result  Answered all her questions and concerns  Her son was present with her  Continue with  glitazone daily  Repeat in 3 months

## 2021-02-23 ASSESSMENT — ENCOUNTER SYMPTOMS
CONSTIPATION: 0
NAUSEA: 0
RESPIRATORY NEGATIVE: 1
EYES NEGATIVE: 1
DIARRHEA: 0
ABDOMINAL PAIN: 1
RECTAL PAIN: 0
ANAL BLEEDING: 0
VOMITING: 0
ABDOMINAL DISTENTION: 0
BLOOD IN STOOL: 0

## 2021-03-04 ENCOUNTER — HOSPITAL ENCOUNTER (OUTPATIENT)
Dept: CT IMAGING | Age: 46
Discharge: HOME | End: 2021-03-04
Payer: MEDICARE

## 2021-03-04 DIAGNOSIS — R93.5 ABNORMAL US (ULTRASOUND) OF ABDOMEN: ICD-10-CM

## 2021-03-04 PROCEDURE — 74177 CT ABD & PELVIS W/CONTRAST: CPT

## 2021-03-04 PROCEDURE — 6360000004 HC RX CONTRAST MEDICATION: Performed by: FAMILY MEDICINE

## 2021-03-04 RX ADMIN — IOHEXOL 50 ML: 240 INJECTION, SOLUTION INTRATHECAL; INTRAVASCULAR; INTRAVENOUS; ORAL at 15:07

## 2021-05-22 ENCOUNTER — PATIENT MESSAGE (OUTPATIENT)
Dept: PRIMARY CARE CLINIC | Age: 46
End: 2021-05-22

## 2021-05-22 DIAGNOSIS — R73.9 HYPERGLYCEMIA: ICD-10-CM

## 2021-05-24 RX ORDER — PIOGLITAZONEHYDROCHLORIDE 15 MG/1
15 TABLET ORAL DAILY
Qty: 30 TABLET | Refills: 3 | Status: SHIPPED | OUTPATIENT
Start: 2021-05-24 | End: 2021-05-27

## 2021-05-24 RX ORDER — PIOGLITAZONEHYDROCHLORIDE 15 MG/1
15 TABLET ORAL DAILY
Qty: 30 TABLET | Refills: 3 | Status: SHIPPED | OUTPATIENT
Start: 2021-05-24 | End: 2021-12-02

## 2021-05-24 NOTE — TELEPHONE ENCOUNTER
From: Joana Moritz  To: Jama Rainey MD  Sent: 5/22/2021 2:37 PM EDT  Subject: Prescription Question    This message is being sent by Stephenie Leon on behalf of Joana Moritz. Hello  . Do you want me to take more pioglitazone ? Im out of refills so if yes send me more refils please.

## 2021-05-24 NOTE — TELEPHONE ENCOUNTER
Medication:   Requested Prescriptions     Pending Prescriptions Disp Refills    pioglitazone (ACTOS) 15 MG tablet 30 tablet 3     Sig: Take 1 tablet by mouth daily     Last Filled: 1.12.21    Last appt: 2/22/2021   Next appt: 5/27/2021    Last Labs DM:   Lab Results   Component Value Date    LABA1C 6.3 08/31/2020

## 2021-05-27 ENCOUNTER — HOSPITAL ENCOUNTER (OUTPATIENT)
Dept: GENERAL RADIOLOGY | Age: 46
Discharge: HOME | End: 2021-05-27
Payer: MEDICARE

## 2021-05-27 ENCOUNTER — HOSPITAL ENCOUNTER (OUTPATIENT)
Age: 46
Discharge: HOME | End: 2021-05-27
Payer: MEDICARE

## 2021-05-27 ENCOUNTER — OFFICE VISIT (OUTPATIENT)
Dept: PRIMARY CARE CLINIC | Age: 46
End: 2021-05-27
Payer: MEDICARE

## 2021-05-27 VITALS
HEART RATE: 105 BPM | HEIGHT: 60 IN | WEIGHT: 213 LBS | OXYGEN SATURATION: 98 % | BODY MASS INDEX: 41.82 KG/M2 | SYSTOLIC BLOOD PRESSURE: 122 MMHG | TEMPERATURE: 98 F | DIASTOLIC BLOOD PRESSURE: 74 MMHG

## 2021-05-27 DIAGNOSIS — G89.29 CHRONIC RIGHT-SIDED LOW BACK PAIN WITHOUT SCIATICA: ICD-10-CM

## 2021-05-27 DIAGNOSIS — Z00.00 ANNUAL PHYSICAL EXAM: Primary | ICD-10-CM

## 2021-05-27 DIAGNOSIS — M54.50 CHRONIC RIGHT-SIDED LOW BACK PAIN WITHOUT SCIATICA: ICD-10-CM

## 2021-05-27 DIAGNOSIS — E11.9 TYPE 2 DIABETES MELLITUS WITHOUT COMPLICATION, WITHOUT LONG-TERM CURRENT USE OF INSULIN (HCC): ICD-10-CM

## 2021-05-27 DIAGNOSIS — F41.9 ANXIETY: ICD-10-CM

## 2021-05-27 PROCEDURE — 73502 X-RAY EXAM HIP UNI 2-3 VIEWS: CPT

## 2021-05-27 PROCEDURE — 72100 X-RAY EXAM L-S SPINE 2/3 VWS: CPT

## 2021-05-27 PROCEDURE — 99396 PREV VISIT EST AGE 40-64: CPT | Performed by: FAMILY MEDICINE

## 2021-05-27 RX ORDER — SERTRALINE HYDROCHLORIDE 25 MG/1
25 TABLET, FILM COATED ORAL DAILY
Qty: 30 TABLET | Refills: 3 | Status: SHIPPED | OUTPATIENT
Start: 2021-05-27 | End: 2021-11-18

## 2021-05-27 ASSESSMENT — ENCOUNTER SYMPTOMS
GASTROINTESTINAL NEGATIVE: 1
RESPIRATORY NEGATIVE: 1
EYES NEGATIVE: 1

## 2021-05-27 NOTE — PROGRESS NOTES
SUBJECTIVE:  Patient ID: Leana Lee is a 50 y.o. y.o. female     HPI   Pt is here for annual physical   Anxiety: Patient complains of evaluation of anxiety disorder. She has the following anxiety symptoms: difficulty concentrating, feelings of losing control, palpitations, psychomotor agitation. Onset of symptoms was approximately several months ago, gradually worsening since that time. She denies current suicidal and homicidal ideation. Family history significant for no psychiatric illness. Possible organic causes contributing are: none. Risk factors: positive family history in  son Previous treatment includes none and none. She complains of the following side effects from the treatment: none. Diabetes she is on Actos 15 mg when she feels nervous anxious she uses the restroom very often she has a headache when she has a stress level up in her life she takes another Actos I  discourage the patient from doing that,  Patient with a recurrent ankle pain I advised the patient to follow-up with her orthopedist  Back Pain: Patient presents for presents evaluation of low back problems. Symptoms have been present for a few months and include pain in lumbar area (aching and throbbing in character; 4/10 in severity). Initial inciting event: none. Symptoms are worst: morning, mid-day, afternoon. Alleviating factors identifiable by patient are none. Exacerbating factors identifiable by patient are none. Treatments so far initiated by patient: none Previous lower back problems: none. Previous workup: none. Previous treatments: OTC Tylenol .         Past Medical History:   Diagnosis Date    Ankle pain     had fall     Hypothyroidism     Urolithiasis     Uterus disorder       Past Surgical History:   Procedure Laterality Date     SECTION      Two C sections    UTERINE FIBROID SURGERY       Family History   Problem Relation Age of Onset    Breast Cancer Mother 39    Diabetes Father      Social History Socioeconomic History    Marital status:      Spouse name: None    Number of children: 11    Years of education: None    Highest education level: None   Occupational History    Occupation: Unemployed   Tobacco Use    Smoking status: Passive Smoke Exposure - Never Smoker    Smokeless tobacco: Never Used   Substance and Sexual Activity    Alcohol use: No    Drug use: No    Sexual activity: Yes     Partners: Male   Other Topics Concern    None   Social History Narrative    None     Social Determinants of Health     Financial Resource Strain: Low Risk     Difficulty of Paying Living Expenses: Not very hard   Food Insecurity: No Food Insecurity    Worried About Running Out of Food in the Last Year: Never true    Bhavik of Food in the Last Year: Never true   Transportation Needs: No Transportation Needs    Lack of Transportation (Medical): No    Lack of Transportation (Non-Medical): No   Physical Activity:     Days of Exercise per Week:     Minutes of Exercise per Session:    Stress:     Feeling of Stress :    Social Connections:     Frequency of Communication with Friends and Family:     Frequency of Social Gatherings with Friends and Family:     Attends Druze Services:     Active Member of Clubs or Organizations:     Attends Club or Organization Meetings:     Marital Status:    Intimate Partner Violence:     Fear of Current or Ex-Partner:     Emotionally Abused:     Physically Abused:     Sexually Abused:      Current Outpatient Medications   Medication Sig Dispense Refill    pioglitazone (ACTOS) 15 MG tablet Take 1 tablet by mouth daily 30 tablet 3    Misc. Devices MISC L-XL compression stockings of patient's choice. Please provide two pairs. Dx: dependent edema. 1 Device 1     No current facility-administered medications for this visit. Allergies   Allergen Reactions    Nsaids        Review of Systems   Constitutional: Negative. HENT: Negative. Eyes: Negative. Respiratory: Negative. Cardiovascular: Negative. Gastrointestinal: Negative. Genitourinary: Positive for frequency and urgency. Psychiatric/Behavioral: Positive for agitation and decreased concentration. The patient is nervous/anxious. All other systems reviewed and are negative. OBJECTIVE:  /74 (Site: Right Upper Arm, Position: Sitting, Cuff Size: Large Adult)   Pulse 105   Temp 98 °F (36.7 °C)   Ht 5' (1.524 m)   Wt 213 lb (96.6 kg)   LMP  (LMP Unknown)   SpO2 98%   Breastfeeding No   BMI 41.60 kg/m²     Physical Exam  Vitals reviewed. Constitutional:       Appearance: Normal appearance. HENT:      Head: Normocephalic and atraumatic. Eyes:      General: No scleral icterus. Conjunctiva/sclera: Conjunctivae normal.   Neck:      Thyroid: No thyromegaly. Vascular: No JVD. Cardiovascular:      Rate and Rhythm: Normal rate and regular rhythm. Heart sounds: Normal heart sounds. No murmur heard. No friction rub. No gallop. Pulmonary:      Effort: Pulmonary effort is normal.      Breath sounds: Normal breath sounds. No wheezing or rales. Abdominal:      General: Bowel sounds are normal. There is no distension. Palpations: Abdomen is soft. There is no mass. Tenderness: There is no abdominal tenderness. Hernia: No hernia is present. Musculoskeletal:      Lumbar back: Tenderness present. Decreased range of motion. Right hip: Tenderness present. Decreased range of motion. Lymphadenopathy:      Cervical: No cervical adenopathy. Skin:     Findings: No rash. Neurological:      Mental Status: She is alert and oriented to person, place, and time. ASSESSMENT:   Diagnosis Orders   1. Annual physical exam  Hemoglobin J1X    Basic Metabolic Panel    CBC    HEPATITIS C ANTIBODY   2. Type 2 diabetes mellitus without complication, without long-term current use of insulin (HCC)  Hemoglobin A1C   3.  Chronic right-sided low back pain without sciatica  XR LUMBAR SPINE (2-3 VIEWS)    XR HIP RIGHT (2-3 VIEWS)    POCT Urinalysis no Micro    Culture, Urine   4.  Anxiety           PLAN:  See orders   Discussed her anxiety   Recommend medication   Prescription is sent  Follow-up with Ortho regarding the ankle  We will call the patient with the x-ray result

## 2021-05-28 LAB — URINE CULTURE, ROUTINE: NORMAL

## 2021-06-03 DIAGNOSIS — E11.9 TYPE 2 DIABETES MELLITUS WITHOUT COMPLICATION, WITHOUT LONG-TERM CURRENT USE OF INSULIN (HCC): ICD-10-CM

## 2021-06-03 DIAGNOSIS — Z00.00 ANNUAL PHYSICAL EXAM: ICD-10-CM

## 2021-06-03 LAB
ANION GAP SERPL CALCULATED.3IONS-SCNC: 11 MMOL/L (ref 3–16)
BUN BLDV-MCNC: 16 MG/DL (ref 7–20)
CALCIUM SERPL-MCNC: 8.6 MG/DL (ref 8.3–10.6)
CHLORIDE BLD-SCNC: 105 MMOL/L (ref 99–110)
CO2: 22 MMOL/L (ref 21–32)
CREAT SERPL-MCNC: <0.5 MG/DL (ref 0.6–1.1)
GFR AFRICAN AMERICAN: >60
GFR NON-AFRICAN AMERICAN: >60
GLUCOSE BLD-MCNC: 113 MG/DL (ref 70–99)
HCT VFR BLD CALC: 37.9 % (ref 36–48)
HEMOGLOBIN: 12.9 G/DL (ref 12–16)
MCH RBC QN AUTO: 30.3 PG (ref 26–34)
MCHC RBC AUTO-ENTMCNC: 34.2 G/DL (ref 31–36)
MCV RBC AUTO: 88.7 FL (ref 80–100)
PDW BLD-RTO: 13.2 % (ref 12.4–15.4)
PLATELET # BLD: 202 K/UL (ref 135–450)
PMV BLD AUTO: 8.5 FL (ref 5–10.5)
POTASSIUM SERPL-SCNC: 4.3 MMOL/L (ref 3.5–5.1)
RBC # BLD: 4.27 M/UL (ref 4–5.2)
SODIUM BLD-SCNC: 138 MMOL/L (ref 136–145)
WBC # BLD: 4.4 K/UL (ref 4–11)

## 2021-06-04 DIAGNOSIS — R76.8 HEPATITIS C ANTIBODY POSITIVE IN BLOOD: Primary | ICD-10-CM

## 2021-06-04 DIAGNOSIS — R76.8 HEPATITIS C ANTIBODY POSITIVE IN BLOOD: ICD-10-CM

## 2021-06-04 LAB
ESTIMATED AVERAGE GLUCOSE: 125.5 MG/DL
HBA1C MFR BLD: 6 %
HEPATITIS C ANTIBODY INTERPRETATION: REACTIVE

## 2021-06-09 LAB
HCV QNT BY NAAT IU/ML: NOT DETECTED IU/ML
HCV QNT BY NAAT LOG IU/ML: NOT DETECTED LOG IU/ML
INTERPRETATION: NOT DETECTED

## 2021-06-15 ENCOUNTER — TELEPHONE (OUTPATIENT)
Dept: PRIMARY CARE CLINIC | Age: 46
End: 2021-06-15

## 2021-06-15 NOTE — TELEPHONE ENCOUNTER
Son previously verbalized understanding of results, is asking for a call back from Dr Crissy Solorzano

## 2021-09-21 ENCOUNTER — TELEPHONE (OUTPATIENT)
Dept: PRIMARY CARE CLINIC | Age: 46
End: 2021-09-21

## 2021-09-21 NOTE — TELEPHONE ENCOUNTER
----- Message from Antonieta Gandara sent at 9/21/2021  8:13 AM EDT -----  Subject: Referral Request    QUESTIONS   Reason for referral request? patient's son called in, wants to make   appointment for [facility], elizabet Mcmillan needs referral to   psychology, requesting Minong Hopper   Has the physician seen you for this condition before? No   Preferred Specialist (if applicable)? Do you already have an appointment scheduled? No  Additional Information for Provider? patient's son called in, wants to   make appointment for [place], elizabet Mcmillan needs   referral to psychology, requesting Minong Hopper  ---------------------------------------------------------------------------  --------------  [address]  What is the best way for the office to contact you? OK to leave message on   voicemail  Preferred Call Back Phone Number?  [phone]

## 2021-09-21 NOTE — TELEPHONE ENCOUNTER
[phone] (home)   Spoke with pts son, advised Dr Emmy Gooden is not seeing patients as she is working at the hospital.  Son decided to not schedule will contact [employer] to see how to get a green card. If an appointment is needed will call to schedule.

## 2021-11-18 ENCOUNTER — OFFICE VISIT (OUTPATIENT)
Dept: PRIMARY CARE CLINIC | Age: 46
End: 2021-11-18
Payer: MEDICARE

## 2021-11-18 VITALS
WEIGHT: 212 LBS | TEMPERATURE: 97.4 F | OXYGEN SATURATION: 99 % | RESPIRATION RATE: 14 BRPM | HEART RATE: 78 BPM | SYSTOLIC BLOOD PRESSURE: 116 MMHG | BODY MASS INDEX: 41.4 KG/M2 | DIASTOLIC BLOOD PRESSURE: 78 MMHG

## 2021-11-18 DIAGNOSIS — Z00.00 ENCOUNTER FOR WELL ADULT EXAM WITHOUT ABNORMAL FINDINGS: Primary | ICD-10-CM

## 2021-11-18 DIAGNOSIS — E11.9 TYPE 2 DIABETES MELLITUS WITHOUT COMPLICATION, WITHOUT LONG-TERM CURRENT USE OF INSULIN (HCC): ICD-10-CM

## 2021-11-18 LAB
ALBUMIN SERPL-MCNC: 4.2 G/DL (ref 3.4–5)
ALP BLD-CCNC: 61 U/L (ref 40–129)
ALT SERPL-CCNC: 11 U/L (ref 10–40)
ANION GAP SERPL CALCULATED.3IONS-SCNC: 12 MMOL/L (ref 3–16)
AST SERPL-CCNC: 13 U/L (ref 15–37)
BILIRUB SERPL-MCNC: <0.2 MG/DL (ref 0–1)
BILIRUBIN DIRECT: <0.2 MG/DL (ref 0–0.3)
BILIRUBIN, INDIRECT: ABNORMAL MG/DL (ref 0–1)
BUN BLDV-MCNC: 14 MG/DL (ref 7–20)
CALCIUM SERPL-MCNC: 8.6 MG/DL (ref 8.3–10.6)
CHLORIDE BLD-SCNC: 104 MMOL/L (ref 99–110)
CHOLESTEROL, TOTAL: 260 MG/DL (ref 0–199)
CO2: 23 MMOL/L (ref 21–32)
CREAT SERPL-MCNC: <0.5 MG/DL (ref 0.6–1.1)
GFR AFRICAN AMERICAN: >60
GFR NON-AFRICAN AMERICAN: >60
GLUCOSE BLD-MCNC: 115 MG/DL (ref 70–99)
HCT VFR BLD CALC: 37.5 % (ref 36–48)
HDLC SERPL-MCNC: 58 MG/DL (ref 40–60)
HEMOGLOBIN: 12.2 G/DL (ref 12–16)
LDL CHOLESTEROL CALCULATED: 181 MG/DL
MCH RBC QN AUTO: 29.3 PG (ref 26–34)
MCHC RBC AUTO-ENTMCNC: 32.7 G/DL (ref 31–36)
MCV RBC AUTO: 89.8 FL (ref 80–100)
PDW BLD-RTO: 13.8 % (ref 12.4–15.4)
PLATELET # BLD: 221 K/UL (ref 135–450)
PMV BLD AUTO: 8.8 FL (ref 5–10.5)
POTASSIUM SERPL-SCNC: 4.4 MMOL/L (ref 3.5–5.1)
RBC # BLD: 4.17 M/UL (ref 4–5.2)
SODIUM BLD-SCNC: 139 MMOL/L (ref 136–145)
TOTAL PROTEIN: 6.9 G/DL (ref 6.4–8.2)
TRIGL SERPL-MCNC: 103 MG/DL (ref 0–150)
TSH SERPL DL<=0.05 MIU/L-ACNC: 1.83 UIU/ML (ref 0.27–4.2)
VLDLC SERPL CALC-MCNC: 21 MG/DL
WBC # BLD: 4.5 K/UL (ref 4–11)

## 2021-11-18 PROCEDURE — 99396 PREV VISIT EST AGE 40-64: CPT | Performed by: FAMILY MEDICINE

## 2021-11-18 PROCEDURE — G8484 FLU IMMUNIZE NO ADMIN: HCPCS | Performed by: FAMILY MEDICINE

## 2021-11-18 RX ORDER — BLOOD-GLUCOSE METER
1 KIT MISCELLANEOUS DAILY
Qty: 1 KIT | Refills: 1 | Status: SHIPPED | OUTPATIENT
Start: 2021-11-18

## 2021-11-18 SDOH — ECONOMIC STABILITY: FOOD INSECURITY: WITHIN THE PAST 12 MONTHS, THE FOOD YOU BOUGHT JUST DIDN'T LAST AND YOU DIDN'T HAVE MONEY TO GET MORE.: NEVER TRUE

## 2021-11-18 SDOH — ECONOMIC STABILITY: FOOD INSECURITY: WITHIN THE PAST 12 MONTHS, YOU WORRIED THAT YOUR FOOD WOULD RUN OUT BEFORE YOU GOT MONEY TO BUY MORE.: NEVER TRUE

## 2021-11-18 ASSESSMENT — ENCOUNTER SYMPTOMS
GASTROINTESTINAL NEGATIVE: 1
EYES NEGATIVE: 1
RESPIRATORY NEGATIVE: 1

## 2021-11-18 ASSESSMENT — SOCIAL DETERMINANTS OF HEALTH (SDOH): HOW HARD IS IT FOR YOU TO PAY FOR THE VERY BASICS LIKE FOOD, HOUSING, MEDICAL CARE, AND HEATING?: NOT HARD AT ALL

## 2021-11-18 NOTE — PROGRESS NOTES
Well Adult Note  Name: Ana Rosa Baldwin Date: 2021   MRN: [id number] Sex: Female   Age: 48 y.o. Ethnicity: Non- / Non    : 1975 Race: Other      Suly Cunha is here for well adult exam.  History:  Pt is here for follow up   Patient with diabetes she could not get the glucometer so she has not been checking at home overall she feels well but occasionally she feels like maybe her sugar dropped down but she does not know  She had a problem with her ankle she has been seen by orthopedist he suggested physical therapy at that time did not go through just because of the Covid lockdown advised the patient to call again and start PT and if she is not better to follow-up with Ortho for her ankle pain    Review of Systems   Constitutional: Negative. HENT: Negative. Eyes: Negative. Respiratory: Negative. Cardiovascular: Negative. Gastrointestinal: Negative. All other systems reviewed and are negative. Allergies   Allergen Reactions    Nsaids        Prior to Visit Medications    Medication Sig Taking? Authorizing Provider   pioglitazone (ACTOS) 15 MG tablet Take 1 tablet by mouth daily Yes Andrew Walker MD   Misc. Devices MISC L-XL compression stockings of patient's choice. Please provide two pairs. Dx: dependent edema.  Yes Jurgen Schaefer MD   sertraline (ZOLOFT) 25 MG tablet Take 1 tablet by mouth daily  Patient not taking: Reported on 2021  Andrew Walker MD       Past Medical History:   Diagnosis Date    Ankle pain     had fall     Hypothyroidism     Urolithiasis     Uterus disorder        Past Surgical History:   Procedure Laterality Date     SECTION      Two C sections    UTERINE FIBROID SURGERY         Family History   Problem Relation Age of Onset    Breast Cancer Mother 39    Diabetes Father        Social History     Tobacco Use    Smoking status: Passive Smoke Exposure - Never Smoker    Smokeless tobacco: Never Used   Substance Use Topics    Alcohol use: No    Drug use: No       Objective   /78 (Site: Left Upper Arm, Position: Sitting, Cuff Size: Medium Adult)   Pulse 78   Temp 97.4 °F (36.3 °C)   Resp 14   Wt 212 lb (96.2 kg)   SpO2 99%   BMI 41.40 kg/m²   Wt Readings from Last 3 Encounters:   11/18/21 212 lb (96.2 kg)   05/27/21 213 lb (96.6 kg)   02/05/21 212 lb (96.2 kg)     There were no vitals filed for this visit. Physical Exam  Vitals reviewed. Constitutional:       Appearance: Normal appearance. HENT:      Head: Normocephalic and atraumatic. Right Ear: Tympanic membrane normal.      Left Ear: Tympanic membrane normal.      Nose: Nose normal.   Eyes:      General: No scleral icterus. Conjunctiva/sclera: Conjunctivae normal.   Neck:      Thyroid: No thyromegaly. Vascular: No JVD. Cardiovascular:      Rate and Rhythm: Normal rate and regular rhythm. Heart sounds: Normal heart sounds. No murmur heard. No friction rub. No gallop. Pulmonary:      Effort: Pulmonary effort is normal.      Breath sounds: Normal breath sounds. No wheezing or rales. Abdominal:      General: Bowel sounds are normal. There is no distension. Palpations: Abdomen is soft. There is no mass. Tenderness: There is no abdominal tenderness. Hernia: No hernia is present. Lymphadenopathy:      Cervical: No cervical adenopathy. Skin:     Findings: No rash. Neurological:      Mental Status: She is alert and oriented to person, place, and time. Assessment   Plan    Diagnosis Orders   1. Encounter for well adult exam without abnormal findings     2.  Type 2 diabetes mellitus without complication, without long-term current use of insulin (HCC)  Basic Metabolic Panel    CBC    Hemoglobin A1C    Hepatic Function Panel    TSH without Reflex    Lipid Panel         Personalized Preventive Plan   Current Health Maintenance Status  Immunization History   Administered Date(s) Administered    Hepatitis B 10/03/2016, 11/22/2016    Influenza Vaccine, unspecified formulation 10/03/2016    MMR 10/03/2016    Tdap (Boostrix, Adacel) 10/03/2016        Health Maintenance   Topic Date Due    COVID-19 Vaccine (1) Never done    Pneumococcal 0-64 years Vaccine (1 of 2 - PPSV23) Never done    Cervical cancer screen  Never done    Hepatitis B vaccine (3 of 3 - Risk 3-dose series) 03/22/2017    Colon cancer screen colonoscopy  Never done    Breast cancer screen  02/17/2021    Flu vaccine (1) 09/01/2021    A1C test (Diabetic or Prediabetic)  06/03/2022    Lipid screen  12/02/2024    DTaP/Tdap/Td vaccine (2 - Td or Tdap) 10/03/2026    Hepatitis C screen  Completed    HIV screen  Completed    Hepatitis A vaccine  Aged Out    Hib vaccine  Aged Out    Meningococcal (ACWY) vaccine  Aged Out     Recommendations for [employer] Due: see orders and patient instructions/AVS.    No follow-ups on file.

## 2021-11-19 DIAGNOSIS — E11.9 TYPE 2 DIABETES MELLITUS WITHOUT COMPLICATION, WITHOUT LONG-TERM CURRENT USE OF INSULIN (HCC): Primary | ICD-10-CM

## 2021-11-19 LAB
ESTIMATED AVERAGE GLUCOSE: 125.5 MG/DL
HBA1C MFR BLD: 6 %

## 2021-11-19 RX ORDER — ATORVASTATIN CALCIUM 20 MG/1
20 TABLET, FILM COATED ORAL DAILY
Qty: 30 TABLET | Refills: 3 | Status: SHIPPED | OUTPATIENT
Start: 2021-11-19

## 2021-12-02 DIAGNOSIS — R73.9 HYPERGLYCEMIA: ICD-10-CM

## 2021-12-02 RX ORDER — PIOGLITAZONEHYDROCHLORIDE 15 MG/1
TABLET ORAL
Qty: 30 TABLET | Refills: 0 | Status: SHIPPED | OUTPATIENT
Start: 2021-12-02 | End: 2022-01-11

## 2021-12-02 NOTE — TELEPHONE ENCOUNTER
Medication:   Requested Prescriptions     Pending Prescriptions Disp Refills    pioglitazone (ACTOS) 15 MG tablet [Pharmacy Med Name: Pioglitazone HCl Oral Tablet 15 MG] 30 tablet 0     Sig: TAKE 1 TABLET BY MOUTH EVERY DAY     Last Filled:  5.24.21  Last appt: 11/18/2021   Next appt: Visit date not found    Last Labs DM:   Lab Results   Component Value Date    LABA1C 6.0 11/18/2021

## 2022-01-11 DIAGNOSIS — R73.9 HYPERGLYCEMIA: ICD-10-CM

## 2022-01-11 RX ORDER — PIOGLITAZONEHYDROCHLORIDE 15 MG/1
TABLET ORAL
Qty: 30 TABLET | Refills: 3 | Status: SHIPPED | OUTPATIENT
Start: 2022-01-11 | End: 2022-10-03

## 2022-01-27 ENCOUNTER — TELEPHONE (OUTPATIENT)
Dept: PRIMARY CARE CLINIC | Age: 47
End: 2022-01-27

## 2022-01-27 NOTE — TELEPHONE ENCOUNTER
Pt son is requesting a referral and a recommendation for a psychiatrist / psychologist for both his parents for immigration purposes. They are trying to obtain there [place] citizenship and because they do not speak english they will need a psychological evaluation.    Please review

## 2022-01-27 NOTE — TELEPHONE ENCOUNTER
He is to check was covered on their insurance, then he can call with the name and number I will put referral for him

## 2022-01-28 NOTE — TELEPHONE ENCOUNTER
Medication:   Requested Prescriptions     Pending Prescriptions Disp Refills    metFORMIN (GLUCOPHAGE) 500 MG tablet [Pharmacy Med Name: metFORMIN HCl Oral Tablet 500 MG] 30 tablet 0     Sig: TAKE 1 TABLET BY MOUTH ONE TIME A DAY WITH BREAKFAST     Last Filled:  9/1/21    Last appt: 11/18/2021   Next appt: Visit date not found    Last Labs DM:   Lab Results   Component Value Date    LABA1C 6.0 11/18/2021

## 2022-02-21 ENCOUNTER — TELEPHONE (OUTPATIENT)
Dept: PRIMARY CARE CLINIC | Age: 47
End: 2022-02-21

## 2022-02-22 RX ORDER — ATORVASTATIN CALCIUM 20 MG/1
20 TABLET, FILM COATED ORAL DAILY
Qty: 90 TABLET | Refills: 1 | Status: SHIPPED | OUTPATIENT
Start: 2022-02-22 | End: 2022-08-23

## 2022-08-22 ENCOUNTER — OFFICE VISIT (OUTPATIENT)
Dept: PRIMARY CARE CLINIC | Age: 47
End: 2022-08-22
Payer: MEDICARE

## 2022-08-22 VITALS
SYSTOLIC BLOOD PRESSURE: 120 MMHG | BODY MASS INDEX: 41.79 KG/M2 | WEIGHT: 214 LBS | HEART RATE: 80 BPM | OXYGEN SATURATION: 99 % | DIASTOLIC BLOOD PRESSURE: 82 MMHG | TEMPERATURE: 97.2 F | RESPIRATION RATE: 14 BRPM

## 2022-08-22 DIAGNOSIS — Z12.31 ENCOUNTER FOR SCREENING MAMMOGRAM FOR BREAST CANCER: ICD-10-CM

## 2022-08-22 DIAGNOSIS — R35.89 POLYURIA: Primary | ICD-10-CM

## 2022-08-22 DIAGNOSIS — K59.00 CONSTIPATION, UNSPECIFIED CONSTIPATION TYPE: ICD-10-CM

## 2022-08-22 DIAGNOSIS — E11.9 TYPE 2 DIABETES MELLITUS WITHOUT COMPLICATION, WITHOUT LONG-TERM CURRENT USE OF INSULIN (HCC): ICD-10-CM

## 2022-08-22 DIAGNOSIS — Z12.11 COLON CANCER SCREENING: ICD-10-CM

## 2022-08-22 LAB
BILIRUBIN, POC: NORMAL
BLOOD URINE, POC: NORMAL
CLARITY, POC: CLEAR
COLOR, POC: NORMAL
GLUCOSE URINE, POC: NORMAL
KETONES, POC: NORMAL
LEUKOCYTE EST, POC: NORMAL
NITRITE, POC: NORMAL
PH, POC: 5
PROTEIN, POC: NORMAL
SPECIFIC GRAVITY, POC: 1.03
UROBILINOGEN, POC: 0.2

## 2022-08-22 PROCEDURE — 2022F DILAT RTA XM EVC RTNOPTHY: CPT | Performed by: FAMILY MEDICINE

## 2022-08-22 PROCEDURE — G8427 DOCREV CUR MEDS BY ELIG CLIN: HCPCS | Performed by: FAMILY MEDICINE

## 2022-08-22 PROCEDURE — 3046F HEMOGLOBIN A1C LEVEL >9.0%: CPT | Performed by: FAMILY MEDICINE

## 2022-08-22 PROCEDURE — 1036F TOBACCO NON-USER: CPT | Performed by: FAMILY MEDICINE

## 2022-08-22 PROCEDURE — 99214 OFFICE O/P EST MOD 30 MIN: CPT | Performed by: FAMILY MEDICINE

## 2022-08-22 PROCEDURE — G8417 CALC BMI ABV UP PARAM F/U: HCPCS | Performed by: FAMILY MEDICINE

## 2022-08-22 ASSESSMENT — PATIENT HEALTH QUESTIONNAIRE - PHQ9
SUM OF ALL RESPONSES TO PHQ QUESTIONS 1-9: 0
2. FEELING DOWN, DEPRESSED OR HOPELESS: 0
SUM OF ALL RESPONSES TO PHQ QUESTIONS 1-9: 0
1. LITTLE INTEREST OR PLEASURE IN DOING THINGS: 0
SUM OF ALL RESPONSES TO PHQ QUESTIONS 1-9: 0
SUM OF ALL RESPONSES TO PHQ QUESTIONS 1-9: 0
SUM OF ALL RESPONSES TO PHQ9 QUESTIONS 1 & 2: 0

## 2022-08-22 NOTE — PROGRESS NOTES
SUBJECTIVE:  Patient ID: Ned Mcneill is a 50 y.o. y.o. female     HPI   Urinary Tract Infection: Patient complains of frequency She has had symptoms for a few days. Patient also complains of  none . Patient denies back pain, congestion, cough, headache, rhinitis, sorethroat, and stomach ache. Patient does not have a history of recurrent UTI. Patient does not have a history of pyelonephritis. Patient does struggle with constipation she has tried couple things over-the-counter not much improvement she does not move a lot  Son is with her during the interpretation  Diabetes Mellitus Type II, Follow-up: Patient here for follow-up of Type 2 diabetes mellitus. Current symptoms/problems include none and have been stable. Symptoms have been present for .     Known diabetic complications: none  Cardiovascular risk factors: diabetes mellitus  Current diabetic medications include  see med's list .     Eye exam current (within one year): unknown  Weight trend: stable  Prior visit with dietician: no  Current diet: in general, a \"healthy\" diet    Current exercise: none        Past Medical History:   Diagnosis Date    Ankle pain     had fall     Hypothyroidism     Urolithiasis     Uterus disorder       Past Surgical History:   Procedure Laterality Date     SECTION      Two C sections    UTERINE FIBROID SURGERY       Family History   Problem Relation Age of Onset    Breast Cancer Mother 39    Diabetes Father      Social History     Socioeconomic History    Marital status:      Spouse name: None    Number of children: 5    Years of education: None    Highest education level: None   Occupational History    Occupation: Unemployed   Tobacco Use    Smoking status: Never     Passive exposure: Yes    Smokeless tobacco: Never   Substance and Sexual Activity    Alcohol use: No    Drug use: No    Sexual activity: Yes     Partners: Male     Social Determinants of Health     Financial Resource Strain: Low Risk     Difficulty of Paying Living Expenses: Not hard at all   Food Insecurity: No Food Insecurity    Worried About [facility] in the Last Year: Never true    Ran Out of Food in the Last Year: Never true     Current Outpatient Medications   Medication Sig Dispense Refill    pioglitazone (ACTOS) 15 MG tablet TAKE 1 TABLET BY MOUTH EVERY DAY 30 tablet 3    atorvastatin (LIPITOR) 20 MG tablet Take 1 tablet by mouth daily 30 tablet 3    glucose monitoring (FREESTYLE FREEDOM) kit 1 kit by Does not apply route daily With lancet and strips 1 kit 1    Misc. Devices MISC L-XL compression stockings of patient's choice. Please provide two pairs. Dx: dependent edema. 1 Device 1    atorvastatin (LIPITOR) 20 MG tablet Take 1 tablet by mouth daily (Patient not taking: Reported on 8/22/2022) 90 tablet 1    metFORMIN (GLUCOPHAGE) 500 MG tablet TAKE 1 TABLET BY MOUTH ONE TIME A DAY WITH BREAKFAST (Patient not taking: Reported on 8/22/2022) 30 tablet 0     No current facility-administered medications for this visit. Allergies   Allergen Reactions    Nsaids        Review of Systems   Constitutional: Negative. HENT: Negative. Eyes: Negative. Respiratory: Negative. Cardiovascular: Negative. Gastrointestinal:  Positive for constipation. Genitourinary:  Positive for dysuria and frequency. All other systems reviewed and are negative. OBJECTIVE:  /82 (Site: Left Upper Arm, Position: Sitting, Cuff Size: Medium Adult)   Pulse 80   Temp 97.2 °F (36.2 °C)   Resp 14   Wt 214 lb (97.1 kg)   SpO2 99%   BMI 41.79 kg/m²     Physical Exam  Vitals reviewed. Eyes:      General: No scleral icterus. Conjunctiva/sclera: Conjunctivae normal.   Neck:      Thyroid: No thyromegaly. Vascular: No JVD. Cardiovascular:      Rate and Rhythm: Normal rate and regular rhythm. Heart sounds: Normal heart sounds. No murmur heard. No friction rub. No gallop.    Pulmonary:      Effort: Pulmonary effort is normal. Breath sounds: Normal breath sounds. No wheezing or rales. Abdominal:      General: Bowel sounds are normal. There is no distension. Palpations: Abdomen is soft. There is no mass. Tenderness: There is no abdominal tenderness. Hernia: No hernia is present. Lymphadenopathy:      Cervical: No cervical adenopathy. Skin:     Findings: No rash. Neurological:      Mental Status: She is alert and oriented to person, place, and time. ASSESSMENT:     Diagnosis Orders   1. Polyuria  POCT Urinalysis no Micro    Basic Metabolic Panel    Hemoglobin A1C    Culture, Urine      2. Encounter for screening mammogram for breast cancer  KO Digital Screen Bilateral      3. Colon cancer screening  FIT-DNA (Cologuard)      4. Type 2 diabetes mellitus without complication, without long-term current use of insulin ([facility])  Basic Metabolic Panel    Hemoglobin A1C      5.  Constipation, unspecified constipation type              PLAN:  See orders   Discussed constipation  Information discussed with given to the son to try over-the-counter product if not better let me know  Increase hydration increased physical activity

## 2022-08-23 LAB — URINE CULTURE, ROUTINE: NORMAL

## 2022-08-23 ASSESSMENT — ENCOUNTER SYMPTOMS
CONSTIPATION: 1
RESPIRATORY NEGATIVE: 1
EYES NEGATIVE: 1

## 2022-09-30 DIAGNOSIS — R35.89 POLYURIA: ICD-10-CM

## 2022-09-30 DIAGNOSIS — E11.9 TYPE 2 DIABETES MELLITUS WITHOUT COMPLICATION, WITHOUT LONG-TERM CURRENT USE OF INSULIN (HCC): ICD-10-CM

## 2022-09-30 LAB
ANION GAP SERPL CALCULATED.3IONS-SCNC: 12 MMOL/L (ref 3–16)
BUN BLDV-MCNC: 13 MG/DL (ref 7–20)
CALCIUM SERPL-MCNC: 8.6 MG/DL (ref 8.3–10.6)
CHLORIDE BLD-SCNC: 106 MMOL/L (ref 99–110)
CO2: 23 MMOL/L (ref 21–32)
CREAT SERPL-MCNC: <0.5 MG/DL (ref 0.6–1.1)
GFR AFRICAN AMERICAN: >60
GFR NON-AFRICAN AMERICAN: >60
GLUCOSE BLD-MCNC: 124 MG/DL (ref 70–99)
POTASSIUM SERPL-SCNC: 4.2 MMOL/L (ref 3.5–5.1)
SODIUM BLD-SCNC: 141 MMOL/L (ref 136–145)

## 2022-10-01 LAB
ESTIMATED AVERAGE GLUCOSE: 128.4 MG/DL
HBA1C MFR BLD: 6.1 %

## 2022-10-03 DIAGNOSIS — R73.9 HYPERGLYCEMIA: ICD-10-CM

## 2022-10-03 RX ORDER — PIOGLITAZONEHYDROCHLORIDE 15 MG/1
TABLET ORAL
Qty: 30 TABLET | Refills: 0 | Status: SHIPPED | OUTPATIENT
Start: 2022-10-03 | End: 2022-11-14

## 2022-10-03 NOTE — TELEPHONE ENCOUNTER
Patient called and requested to refill the following medication    pioglitazone (ACTOS) 15 MG tablet     Preferred [address] [place] [phone] - F [phone]   [address] [place] [address], [address]   Phone:  [phone]  Fax:  [phone]    Please review     Thanks

## 2022-10-03 NOTE — TELEPHONE ENCOUNTER
Medication:   Requested Prescriptions     Pending Prescriptions Disp Refills    pioglitazone (ACTOS) 15 MG tablet [Pharmacy Med Name: Pioglitazone HCl Oral Tablet 15 MG] 30 tablet 0     Sig: TAKE 1 TABLET BY MOUTH EVERY DAY       Last Filled:      Patient Phone Number: [phone] (home)     Last appt: 8/22/2022   Next appt: Visit date not found    Last Labs DM:   Lab Results   Component Value Date/Time    LABA1C 6.1 09/30/2022 12:14 PM       Last OARRS: No flowsheet data found. Preferred Pharmacy:   [address] [place] [address] Avenue  Phone: [phone] Fax: [phone]  Medication:   Requested Prescriptions     Pending Prescriptions Disp Refills    pioglitazone (ACTOS) 15 MG tablet [Pharmacy Med Name: Pioglitazone HCl Oral Tablet 15 MG] 30 tablet 0     Sig: TAKE 1 TABLET BY MOUTH EVERY DAY       Last Filled:      Patient Phone Number: [phone] (home)     Last appt: 8/22/2022   Next appt: Visit date not found    Last Labs DM:   Lab Results   Component Value Date/Time    LABA1C 6.1 09/30/2022 12:14 PM       Last OARRS: No flowsheet data found.     Preferred Pharmacy:   [address] [place] [address] [address]  Phone: [phone] Fax: [phone]

## 2022-11-04 ENCOUNTER — TELEPHONE (OUTPATIENT)
Dept: PRIMARY CARE CLINIC | Age: 47
End: 2022-11-04

## 2022-11-04 NOTE — TELEPHONE ENCOUNTER
Spoke with Melissa and advised that Юлия Michaud should be seen today in person for severe eye pain x 3 days with diabetes. Recommended urgent care or the ER. He agreed with plan.  Will cancel tomorrow AM appt

## 2022-11-11 DIAGNOSIS — R73.9 HYPERGLYCEMIA: ICD-10-CM

## 2022-11-14 RX ORDER — PIOGLITAZONEHYDROCHLORIDE 15 MG/1
TABLET ORAL
Qty: 30 TABLET | Refills: 0 | Status: SHIPPED | OUTPATIENT
Start: 2022-11-14

## 2022-11-14 NOTE — TELEPHONE ENCOUNTER
Medication:   Requested Prescriptions     Pending Prescriptions Disp Refills    pioglitazone (ACTOS) 15 MG tablet [Pharmacy Med Name: Pioglitazone HCl Oral Tablet 15 MG] 30 tablet 0     Sig: TAKE 1 TABLET BY MOUTH EVERY DAY     Last Filled:  10/03/2022    Last appt: 8/22/2022   Next appt: Visit date not found    Last Labs DM:   Lab Results   Component Value Date/Time    LABA1C 6.1 09/30/2022 12:14 PM

## 2022-12-06 ENCOUNTER — NURSE TRIAGE (OUTPATIENT)
Dept: OTHER | Facility: CLINIC | Age: 47
End: 2022-12-06

## 2022-12-09 ENCOUNTER — TELEPHONE (OUTPATIENT)
Dept: PSYCHOLOGY | Age: 47
End: 2022-12-09

## 2022-12-09 NOTE — TELEPHONE ENCOUNTER
Pt dropped of N648 paperwork to be filled out by Doctor Jose Jay. Pls call pt when ready to be picked up.

## 2022-12-27 DIAGNOSIS — R73.9 HYPERGLYCEMIA: ICD-10-CM

## 2022-12-28 RX ORDER — PIOGLITAZONEHYDROCHLORIDE 15 MG/1
TABLET ORAL
Qty: 30 TABLET | Refills: 2 | Status: SHIPPED | OUTPATIENT
Start: 2022-12-28

## 2022-12-28 NOTE — TELEPHONE ENCOUNTER
Medication:   Requested Prescriptions     Pending Prescriptions Disp Refills    pioglitazone (ACTOS) 15 MG tablet [Pharmacy Med Name: Pioglitazone HCl Oral Tablet 15 MG] 30 tablet 0     Sig: TAKE 1 TABLET BY MOUTH EVERY DAY     Last Filled:  11/14/2022    Last appt: 8/22/2022   Next appt: 1/3/2023    Last OARRS: No flowsheet data found.

## 2023-01-03 ENCOUNTER — OFFICE VISIT (OUTPATIENT)
Dept: PRIMARY CARE CLINIC | Age: 48
End: 2023-01-03
Payer: MEDICARE

## 2023-01-03 VITALS
WEIGHT: 216.8 LBS | HEART RATE: 68 BPM | BODY MASS INDEX: 42.56 KG/M2 | RESPIRATION RATE: 12 BRPM | DIASTOLIC BLOOD PRESSURE: 98 MMHG | TEMPERATURE: 97.8 F | HEIGHT: 60 IN | SYSTOLIC BLOOD PRESSURE: 144 MMHG | OXYGEN SATURATION: 97 %

## 2023-01-03 DIAGNOSIS — F41.9 ANXIETY: ICD-10-CM

## 2023-01-03 DIAGNOSIS — K59.00 CONSTIPATION, UNSPECIFIED CONSTIPATION TYPE: ICD-10-CM

## 2023-01-03 DIAGNOSIS — E11.9 TYPE 2 DIABETES MELLITUS WITHOUT COMPLICATION, WITHOUT LONG-TERM CURRENT USE OF INSULIN (HCC): Primary | ICD-10-CM

## 2023-01-03 PROCEDURE — 3046F HEMOGLOBIN A1C LEVEL >9.0%: CPT | Performed by: FAMILY MEDICINE

## 2023-01-03 PROCEDURE — 99214 OFFICE O/P EST MOD 30 MIN: CPT | Performed by: FAMILY MEDICINE

## 2023-01-03 PROCEDURE — G8484 FLU IMMUNIZE NO ADMIN: HCPCS | Performed by: FAMILY MEDICINE

## 2023-01-03 PROCEDURE — G8417 CALC BMI ABV UP PARAM F/U: HCPCS | Performed by: FAMILY MEDICINE

## 2023-01-03 PROCEDURE — 1036F TOBACCO NON-USER: CPT | Performed by: FAMILY MEDICINE

## 2023-01-03 PROCEDURE — G8427 DOCREV CUR MEDS BY ELIG CLIN: HCPCS | Performed by: FAMILY MEDICINE

## 2023-01-03 PROCEDURE — 2022F DILAT RTA XM EVC RTNOPTHY: CPT | Performed by: FAMILY MEDICINE

## 2023-01-03 RX ORDER — SERTRALINE HYDROCHLORIDE 25 MG/1
25 TABLET, FILM COATED ORAL DAILY
Qty: 30 TABLET | Refills: 3 | Status: SHIPPED | OUTPATIENT
Start: 2023-01-03

## 2023-01-03 SDOH — ECONOMIC STABILITY: FOOD INSECURITY: WITHIN THE PAST 12 MONTHS, YOU WORRIED THAT YOUR FOOD WOULD RUN OUT BEFORE YOU GOT MONEY TO BUY MORE.: NEVER TRUE

## 2023-01-03 SDOH — ECONOMIC STABILITY: FOOD INSECURITY: WITHIN THE PAST 12 MONTHS, THE FOOD YOU BOUGHT JUST DIDN'T LAST AND YOU DIDN'T HAVE MONEY TO GET MORE.: NEVER TRUE

## 2023-01-03 ASSESSMENT — PATIENT HEALTH QUESTIONNAIRE - PHQ9
SUM OF ALL RESPONSES TO PHQ9 QUESTIONS 1 & 2: 0
SUM OF ALL RESPONSES TO PHQ QUESTIONS 1-9: 0
1. LITTLE INTEREST OR PLEASURE IN DOING THINGS: 0
SUM OF ALL RESPONSES TO PHQ QUESTIONS 1-9: 0
2. FEELING DOWN, DEPRESSED OR HOPELESS: 0

## 2023-01-03 ASSESSMENT — ENCOUNTER SYMPTOMS
GASTROINTESTINAL NEGATIVE: 1
EYES NEGATIVE: 1
RESPIRATORY NEGATIVE: 1

## 2023-01-03 ASSESSMENT — SOCIAL DETERMINANTS OF HEALTH (SDOH): HOW HARD IS IT FOR YOU TO PAY FOR THE VERY BASICS LIKE FOOD, HOUSING, MEDICAL CARE, AND HEATING?: NOT HARD AT ALL

## 2023-01-03 NOTE — PROGRESS NOTES
SUBJECTIVE:  Patient ID: Kirk Mckeon is a 50 y.o. y.o. female     HPI   Diabetes Mellitus Type II, Follow-up: Patient here for follow-up of Type 2 diabetes mellitus. Current symptoms/problems include none and have been stable. Symptoms have been present for a few month. Known diabetic complications: none  Cardiovascular risk factors: diabetes mellitus, obesity (BMI >= 30 kg/m2), sedentary lifestyle, and smoking/ tobacco exposure  Current diabetic medications include  see orders . Eye exam current (within one year): unknown  Weight trend: increasing steadily  Prior visit with dietician: no  Current diet: in general, a \"healthy\" diet    Current exercise: none    Current monitoring regimen: none  Home blood sugar records: patient does not test  Any episodes of hypoglycemia? no      Anxiety: Patient complains of evaluation of anxiety disorder. She has the following anxiety symptoms: difficulty concentrating, feelings of losing control, palpitations, psychomotor agitation. Onset of symptoms was approximately several months ago, gradually worsening since that time. She denies current suicidal and homicidal ideation. Family history significant for no psychiatric illness. Possible organic causes contributing are: none. Risk factors: positive family history in  son Previous treatment includes none and none. She complains of the following side effects from the treatment: none.   She struggle with  constipation for years she is due to have a colonoscopy and have one  Past Medical History:   Diagnosis Date    Ankle pain     had fall     Hypothyroidism     Urolithiasis     Uterus disorder       Past Surgical History:   Procedure Laterality Date     SECTION      Two C sections    UTERINE FIBROID SURGERY       Family History   Problem Relation Age of Onset    Breast Cancer Mother 39    Diabetes Father      Social History     Socioeconomic History    Marital status:     Number of children: 5   Occupational History    Occupation: Unemployed   Tobacco Use    Smoking status: Never     Passive exposure: Yes    Smokeless tobacco: Never   Substance and Sexual Activity    Alcohol use: No    Drug use: No    Sexual activity: Yes     Partners: Male     Social Determinants of Health     Financial Resource Strain: Low Risk     Difficulty of Paying Living Expenses: Not hard at all   Food Insecurity: No Food Insecurity    Worried About [address] in the Last Year: Never true    Ran Out of Food in the Last Year: Never true     Current Outpatient Medications   Medication Sig Dispense Refill    pioglitazone (ACTOS) 15 MG tablet TAKE 1 TABLET BY MOUTH EVERY DAY 30 tablet 2    atorvastatin (LIPITOR) 20 MG tablet Take 1 tablet by mouth daily 30 tablet 3    glucose monitoring (FREESTYLE FREEDOM) kit 1 kit by Does not apply route daily With lancet and strips 1 kit 1    Misc. Devices MISC L-XL compression stockings of patient's choice. Please provide two pairs. Dx: dependent edema. 1 Device 1     No current facility-administered medications for this visit. Allergies   Allergen Reactions    Nsaids        Review of Systems   Constitutional: Negative. HENT: Negative. Eyes: Negative. Respiratory: Negative. Cardiovascular: Negative. Gastrointestinal: Negative. Psychiatric/Behavioral:  Negative for agitation, behavioral problems, confusion, decreased concentration, dysphoric mood, hallucinations, self-injury and sleep disturbance. The patient is nervous/anxious. All other systems reviewed and are negative. OBJECTIVE:  BP (!) 144/98   Pulse 68   Temp 97.8 °F (36.6 °C) (Oral)   Resp 12   Ht 5' (1.524 m)   Wt 216 lb 12.8 oz (98.3 kg)   SpO2 97%   BMI 42.34 kg/m²     Physical Exam  Vitals reviewed. HENT:      Head: Normocephalic and atraumatic.       Right Ear: Tympanic membrane and ear canal normal.      Left Ear: Tympanic membrane and ear canal normal.      Nose: Nose normal.   Eyes:      General: No scleral icterus. Conjunctiva/sclera: Conjunctivae normal.   Neck:      Thyroid: No thyromegaly. Vascular: No JVD. Cardiovascular:      Rate and Rhythm: Normal rate and regular rhythm. Heart sounds: Normal heart sounds. No murmur heard. No friction rub. No gallop. Pulmonary:      Effort: Pulmonary effort is normal.      Breath sounds: Normal breath sounds. No wheezing or rales. Abdominal:      General: Bowel sounds are normal. There is no distension. Palpations: Abdomen is soft. There is no mass. Tenderness: There is no abdominal tenderness. Hernia: No hernia is present. Musculoskeletal:      Cervical back: Normal range of motion. Lymphadenopathy:      Cervical: No cervical adenopathy. Skin:     Findings: No rash. Neurological:      Mental Status: She is alert and oriented to person, place, and time. ASSESSMENT:   Diagnosis Orders   1. Type 2 diabetes mellitus without complication, without long-term current use of insulin (HCC)   DIABETES EYE EXAM     DIABETES FOOT EXAM    Basic Metabolic Panel    CBC with Auto Differential    Lipid Panel    Hemoglobin A1C    Hepatic Function Panel    TSH    Vitamin B12 & Folate      2. Constipation, unspecified constipation type  [facility] - Kat Bonilla MD, Gastroenterology, [facility]      3.  Anxiety              PLAN:  See orders   Discussed healthier lifestyle decrease portion decrease caloric intake increase physical activity  Trial of SSRI monitor symptoms closely

## 2023-01-05 DIAGNOSIS — E11.9 TYPE 2 DIABETES MELLITUS WITHOUT COMPLICATION, WITHOUT LONG-TERM CURRENT USE OF INSULIN (HCC): ICD-10-CM

## 2023-01-05 LAB
ALBUMIN SERPL-MCNC: 3.9 G/DL (ref 3.4–5)
ALP BLD-CCNC: 73 U/L (ref 40–129)
ALT SERPL-CCNC: 8 U/L (ref 10–40)
ANION GAP SERPL CALCULATED.3IONS-SCNC: 12 MMOL/L (ref 3–16)
AST SERPL-CCNC: 12 U/L (ref 15–37)
BASOPHILS ABSOLUTE: 0 K/UL (ref 0–0.2)
BASOPHILS RELATIVE PERCENT: 0.3 %
BILIRUB SERPL-MCNC: 0.3 MG/DL (ref 0–1)
BILIRUBIN DIRECT: <0.2 MG/DL (ref 0–0.3)
BILIRUBIN, INDIRECT: ABNORMAL MG/DL (ref 0–1)
BUN BLDV-MCNC: 13 MG/DL (ref 7–20)
CALCIUM SERPL-MCNC: 8.8 MG/DL (ref 8.3–10.6)
CHLORIDE BLD-SCNC: 105 MMOL/L (ref 99–110)
CHOLESTEROL, TOTAL: 220 MG/DL (ref 0–199)
CO2: 22 MMOL/L (ref 21–32)
CREAT SERPL-MCNC: <0.5 MG/DL (ref 0.6–1.1)
EOSINOPHILS ABSOLUTE: 0 K/UL (ref 0–0.6)
EOSINOPHILS RELATIVE PERCENT: 1 %
FOLATE: 12.3 NG/ML (ref 4.78–24.2)
GFR SERPL CREATININE-BSD FRML MDRD: >60 ML/MIN/{1.73_M2}
GLUCOSE BLD-MCNC: 112 MG/DL (ref 70–99)
HCT VFR BLD CALC: 38.8 % (ref 36–48)
HDLC SERPL-MCNC: 50 MG/DL (ref 40–60)
HEMOGLOBIN: 12.6 G/DL (ref 12–16)
LDL CHOLESTEROL CALCULATED: 146 MG/DL
LYMPHOCYTES ABSOLUTE: 1.5 K/UL (ref 1–5.1)
LYMPHOCYTES RELATIVE PERCENT: 30.7 %
MCH RBC QN AUTO: 29.2 PG (ref 26–34)
MCHC RBC AUTO-ENTMCNC: 32.5 G/DL (ref 31–36)
MCV RBC AUTO: 89.8 FL (ref 80–100)
MONOCYTES ABSOLUTE: 0.2 K/UL (ref 0–1.3)
MONOCYTES RELATIVE PERCENT: 4.4 %
NEUTROPHILS ABSOLUTE: 3 K/UL (ref 1.7–7.7)
NEUTROPHILS RELATIVE PERCENT: 63.6 %
PDW BLD-RTO: 13.7 % (ref 12.4–15.4)
PLATELET # BLD: 213 K/UL (ref 135–450)
PMV BLD AUTO: 8.7 FL (ref 5–10.5)
POTASSIUM SERPL-SCNC: 4.2 MMOL/L (ref 3.5–5.1)
RBC # BLD: 4.32 M/UL (ref 4–5.2)
SODIUM BLD-SCNC: 139 MMOL/L (ref 136–145)
TOTAL PROTEIN: 6.8 G/DL (ref 6.4–8.2)
TRIGL SERPL-MCNC: 118 MG/DL (ref 0–150)
TSH SERPL DL<=0.05 MIU/L-ACNC: 1.44 UIU/ML (ref 0.27–4.2)
VITAMIN B-12: 661 PG/ML (ref 211–911)
VLDLC SERPL CALC-MCNC: 24 MG/DL
WBC # BLD: 4.8 K/UL (ref 4–11)

## 2023-01-06 LAB
ESTIMATED AVERAGE GLUCOSE: 125.5 MG/DL
HBA1C MFR BLD: 6 %

## 2023-02-24 DIAGNOSIS — E11.9 TYPE 2 DIABETES MELLITUS WITHOUT COMPLICATION, WITHOUT LONG-TERM CURRENT USE OF INSULIN (HCC): ICD-10-CM

## 2023-02-24 RX ORDER — ATORVASTATIN CALCIUM 20 MG/1
TABLET, FILM COATED ORAL
Qty: 90 TABLET | Refills: 0 | Status: SHIPPED | OUTPATIENT
Start: 2023-02-24

## 2023-04-25 ENCOUNTER — TELEPHONE (OUTPATIENT)
Dept: PRIMARY CARE CLINIC | Age: 48
End: 2023-04-25

## 2023-04-25 NOTE — TELEPHONE ENCOUNTER
Medication Refill Request  Patient requesting topical cream refill for rash.   Please follow up with patient

## 2023-05-22 DIAGNOSIS — E11.9 TYPE 2 DIABETES MELLITUS WITHOUT COMPLICATION, WITHOUT LONG-TERM CURRENT USE OF INSULIN (HCC): ICD-10-CM

## 2023-05-22 RX ORDER — ATORVASTATIN CALCIUM 20 MG/1
TABLET, FILM COATED ORAL
Qty: 90 TABLET | Refills: 0 | Status: SHIPPED | OUTPATIENT
Start: 2023-05-22

## 2023-05-22 NOTE — TELEPHONE ENCOUNTER
Medication:   Requested Prescriptions     Pending Prescriptions Disp Refills    atorvastatin (LIPITOR) 20 MG tablet [Pharmacy Med Name: Atorvastatin Calcium Oral Tablet 20 MG] 90 tablet 0     Sig: TAKE 1 TABLET BY MOUTH EVERY DAY     Last Filled:  02/24/23    Last appt: 1/3/2023   Next appt: 5/23/2023    Last Lipid:   Lab Results   Component Value Date/Time    CHOL 220 01/05/2023 02:21 PM    TRIG 118 01/05/2023 02:21 PM    HDL 50 01/05/2023 02:21 PM    [address] 01/05/2023 02:21 PM

## 2023-05-23 ENCOUNTER — OFFICE VISIT (OUTPATIENT)
Dept: PRIMARY CARE CLINIC | Age: 48
End: 2023-05-23
Payer: MEDICAID

## 2023-05-23 VITALS
DIASTOLIC BLOOD PRESSURE: 82 MMHG | HEART RATE: 88 BPM | BODY MASS INDEX: 41.33 KG/M2 | TEMPERATURE: 97.3 F | SYSTOLIC BLOOD PRESSURE: 116 MMHG | WEIGHT: 211.6 LBS | OXYGEN SATURATION: 97 %

## 2023-05-23 DIAGNOSIS — B35.9 TINEA: Primary | ICD-10-CM

## 2023-05-23 DIAGNOSIS — G89.29 CHRONIC PAIN OF RIGHT ANKLE: ICD-10-CM

## 2023-05-23 DIAGNOSIS — M25.571 CHRONIC PAIN OF RIGHT ANKLE: ICD-10-CM

## 2023-05-23 PROCEDURE — 99213 OFFICE O/P EST LOW 20 MIN: CPT | Performed by: FAMILY MEDICINE

## 2023-05-23 RX ORDER — KETOCONAZOLE 20 MG/G
CREAM TOPICAL
Qty: 60 G | Refills: 1 | Status: SHIPPED | OUTPATIENT
Start: 2023-05-23

## 2023-05-23 SDOH — ECONOMIC STABILITY: HOUSING INSECURITY
IN THE LAST 12 MONTHS, WAS THERE A TIME WHEN YOU DID NOT HAVE A STEADY PLACE TO SLEEP OR SLEPT IN A SHELTER (INCLUDING NOW)?: PATIENT REFUSED

## 2023-05-23 SDOH — ECONOMIC STABILITY: FOOD INSECURITY: WITHIN THE PAST 12 MONTHS, THE FOOD YOU BOUGHT JUST DIDN'T LAST AND YOU DIDN'T HAVE MONEY TO GET MORE.: PATIENT DECLINED

## 2023-05-23 SDOH — ECONOMIC STABILITY: FOOD INSECURITY: WITHIN THE PAST 12 MONTHS, YOU WORRIED THAT YOUR FOOD WOULD RUN OUT BEFORE YOU GOT MONEY TO BUY MORE.: PATIENT DECLINED

## 2023-05-23 SDOH — ECONOMIC STABILITY: INCOME INSECURITY: HOW HARD IS IT FOR YOU TO PAY FOR THE VERY BASICS LIKE FOOD, HOUSING, MEDICAL CARE, AND HEATING?: PATIENT DECLINED

## 2023-05-23 ASSESSMENT — ENCOUNTER SYMPTOMS
EYES NEGATIVE: 1
RESPIRATORY NEGATIVE: 1
GASTROINTESTINAL NEGATIVE: 1

## 2023-05-23 NOTE — PROGRESS NOTES
SUBJECTIVE:  Patient ID: Faith Pike is a 50 y.o. y.o. female     HPI   Rash: Patient complains of rash involving the  under brease,lower abdomen  . Rash started several months ago. Appearance of rash at onset: Color of lesion(s): grey. Rash has not changed over time Initial distribution:  under breast .  Discomfort associated with rash: is pruritic. Associated symptoms: none. Denies: fever, congestion, headache, arthralgia, abdominal pain, nausea, dark urine, diarrhea. Patient has not had previous evaluation of rash. Patient has had previous treatment. Response to treatment: helped some some kind of cream [place]. Patient has had contacts with similar rash. Patient has not identified precipitant.  Patient has not had new exposures (soaps, lotions, laundry detergents, foods, medications, plants, insects or animals.)    Past Medical History:   Diagnosis Date    Ankle pain     had fall     Hypothyroidism     Urolithiasis     Uterus disorder       Past Surgical History:   Procedure Laterality Date     SECTION      Two C sections    UTERINE FIBROID SURGERY       Family History   Problem Relation Age of Onset    Breast Cancer Mother 39    Diabetes Father      Social History     Socioeconomic History    Marital status:      Spouse name: None    Number of children: 5    Years of education: None    Highest education level: None   Occupational History    Occupation: Unemployed   Tobacco Use    Smoking status: Never     Passive exposure: Yes    Smokeless tobacco: Never   Substance and Sexual Activity    Alcohol use: No    Drug use: No    Sexual activity: Yes     Partners: Male     Social Determinants of Health     Financial Resource Strain: Unknown    Difficulty of Paying Living Expenses: Patient refused   Food Insecurity: Unknown    Worried About Running Out of Food in the Last Year: Patient refused    Ran Out of Food in the Last Year: Patient refused   Transportation Needs: Unknown    Lack of

## 2023-07-29 DIAGNOSIS — E11.9 TYPE 2 DIABETES MELLITUS WITHOUT COMPLICATION, WITHOUT LONG-TERM CURRENT USE OF INSULIN (HCC): ICD-10-CM

## 2023-07-31 RX ORDER — ATORVASTATIN CALCIUM 20 MG/1
TABLET, FILM COATED ORAL
Qty: 90 TABLET | Refills: 0 | Status: SHIPPED | OUTPATIENT
Start: 2023-07-31

## 2023-07-31 NOTE — TELEPHONE ENCOUNTER
Medication:   Requested Prescriptions     Pending Prescriptions Disp Refills    atorvastatin (LIPITOR) 20 MG tablet [Pharmacy Med Name: Atorvastatin Calcium Oral Tablet 20 MG] 90 tablet 0     Sig: TAKE 1 TABLET BY MOUTH EVERY DAY     Last Filled:  05/22/23    Last appt: 5/23/2023   Next appt: Visit date not found    Last Lipid:   Lab Results   Component Value Date/Time    CHOL 220 01/05/2023 02:21 PM    TRIG 118 01/05/2023 02:21 PM    HDL 50 01/05/2023 02:21 PM    [address] [facility] 146 01/05/2023 02:21 PM patient

## 2023-09-25 DIAGNOSIS — E11.9 TYPE 2 DIABETES MELLITUS WITHOUT COMPLICATION, WITHOUT LONG-TERM CURRENT USE OF INSULIN (HCC): ICD-10-CM

## 2023-09-25 DIAGNOSIS — R73.9 HYPERGLYCEMIA: ICD-10-CM

## 2023-09-25 RX ORDER — PIOGLITAZONEHYDROCHLORIDE 15 MG/1
TABLET ORAL
Qty: 30 TABLET | Refills: 0 | Status: SHIPPED | OUTPATIENT
Start: 2023-09-25

## 2023-09-25 RX ORDER — ATORVASTATIN CALCIUM 20 MG/1
TABLET, FILM COATED ORAL
Qty: 90 TABLET | Refills: 0 | Status: SHIPPED | OUTPATIENT
Start: 2023-09-25

## 2023-09-25 NOTE — TELEPHONE ENCOUNTER
Medication:   Requested Prescriptions     Pending Prescriptions Disp Refills    atorvastatin (LIPITOR) 20 MG tablet [Pharmacy Med Name: Atorvastatin Calcium Oral Tablet 20 MG] 90 tablet 0     Sig: TAKE 1 TABLET BY MOUTH EVERY DAY       Last Filled:      Patient Phone Number: [phone] (home)     Last appt: 5/23/2023   Next appt: Visit date not found    Last Lipid:   Lab Results   Component Value Date/Time    CHOL 220 01/05/2023 02:21 PM    TRIG 118 01/05/2023 02:21 PM    HDL 50 01/05/2023 02:21 PM    [address] [place] [address] 01/05/2023 02:21 PM       Last OARRS:        No data to display                Preferred Pharmacy:   [address] [phone] - F [phone]  [address] [place] [address]. [address]  Phone: [phone] Fax: [phone]    Medication:   Requested Prescriptions     Pending Prescriptions Disp Refills    atorvastatin (LIPITOR) 20 MG tablet [Pharmacy Med Name: Atorvastatin Calcium Oral Tablet 20 MG] 90 tablet 0     Sig: TAKE 1 TABLET BY MOUTH EVERY DAY       Last Filled:      Patient Phone Number: [phone] (home)     Last appt: 5/23/2023   Next appt: Visit date not found    Last Lipid:   Lab Results   Component Value Date/Time    CHOL 220 01/05/2023 02:21 PM    TRIG 118 01/05/2023 02:21 PM    HDL 50 01/05/2023 02:21 PM    [address] [place] [address] 01/05/2023 02:21 PM       Last OARRS:        No data to display                Preferred Pharmacy:   [address] -  [phone] - F [phone]  [address] [place] [address] [address]  Phone: [phone] Fax: [phone]

## 2023-09-25 NOTE — TELEPHONE ENCOUNTER
Medication:   Requested Prescriptions     Pending Prescriptions Disp Refills    pioglitazone (ACTOS) 15 MG tablet [Pharmacy Med Name: Pioglitazone HCl Oral Tablet 15 MG] 30 tablet 0     Sig: TAKE 1 TABLET BY MOUTH EVERY DAY       Last Filled:      Patient Phone Number: [phone] (home)     Last appt: 5/23/2023   Next appt: 9/25/2023    Last Labs DM:   Lab Results   Component Value Date/Time    LABA1C 6.0 01/05/2023 02:21 PM       Last OARRS:        No data to display                Preferred Pharmacy:   [address] - P [phone] - F [phone]  [address] [place] [address].  Phone: [phone] Fax: [phone]  Medication:   Requested Prescriptions     Pending Prescriptions Disp Refills    pioglitazone (ACTOS) 15 MG tablet [Pharmacy Med Name: Pioglitazone HCl Oral Tablet 15 MG] 30 tablet 0     Sig: TAKE 1 TABLET BY MOUTH EVERY DAY       Last Filled:      Patient Phone Number: [phone] (home)     Last appt: 5/23/2023   Next appt: 9/25/2023    Last Labs DM:   Lab Results   Component Value Date/Time    LABA1C 6.0 01/05/2023 02:21 PM       Last OARRS:        No data to display                Preferred Pharmacy:   [address], [facility] [address] - P [phone] - F [phone]  [address] [place] [address]  Phone: [phone] Fax: [phone]

## 2023-10-09 RX ORDER — POLYETHYLENE GLYCOL 3350 17 G/17G
17 POWDER, FOR SOLUTION ORAL DAILY
Qty: 1530 G | Refills: 1 | Status: SHIPPED | OUTPATIENT
Start: 2023-10-09

## 2023-10-09 NOTE — TELEPHONE ENCOUNTER
----- Message from Joo Ryder sent at 10/9/2023  1:12 PM EDT -----  Subject: Message to Provider    QUESTIONS  Information for Provider? PT is requesting if Dr. Kushal aWyne can send a   prescription to the pharmacy for Miralax Powder. [facility] Anderson [facility], [address] [place] [address] - P [phone] - F [phone]. Any question please call PT back at [phone]  ---------------------------------------------------------------------------  --------------  [address]  [phone]; OK to leave message on voicemail  ---------------------------------------------------------------------------  --------------  SCRIPT ANSWERS  Relationship to Patient?  Self

## 2023-10-09 NOTE — TELEPHONE ENCOUNTER
Medication:   Requested Prescriptions     Pending Prescriptions Disp Refills    polyethylene glycol (GLYCOLAX) 17 GM/SCOOP powder 1530 g 1     Sig: Take 17 g by mouth daily     Last Filled:  Never- Please advise if pended medication is correct and send to pharmacy     Last appt: 5/23/2023   Next appt: Visit date not found    Last Labs DM:   Lab Results   Component Value Date/Time    LABA1C 6.0 01/05/2023 02:21 PM     Last Lipid:   Lab Results   Component Value Date/Time    CHOL 220 01/05/2023 02:21 PM    TRIG 118 01/05/2023 02:21 PM    HDL 50 01/05/2023 02:21 PM    [address] [facility] 146 01/05/2023 02:21 PM     Last PSA: No results found for: \"PSA\"  Last Thyroid:   Lab Results   Component Value Date/Time    TSH 1.44 01/05/2023 02:21 PM

## 2023-11-24 DIAGNOSIS — R73.9 HYPERGLYCEMIA: ICD-10-CM

## 2023-11-27 DIAGNOSIS — E11.9 TYPE 2 DIABETES MELLITUS WITHOUT COMPLICATION, WITHOUT LONG-TERM CURRENT USE OF INSULIN (HCC): ICD-10-CM

## 2023-11-27 RX ORDER — ATORVASTATIN CALCIUM 20 MG/1
20 TABLET, FILM COATED ORAL DAILY
Qty: 90 TABLET | Refills: 0 | Status: SHIPPED | OUTPATIENT
Start: 2023-11-27

## 2023-11-27 RX ORDER — PIOGLITAZONEHYDROCHLORIDE 15 MG/1
TABLET ORAL
Qty: 30 TABLET | Refills: 0 | Status: SHIPPED | OUTPATIENT
Start: 2023-11-27

## 2023-11-27 NOTE — TELEPHONE ENCOUNTER
Medication:   Requested Prescriptions     Pending Prescriptions Disp Refills    atorvastatin (LIPITOR) 20 MG tablet 90 tablet 0     Sig: Take 1 tablet by mouth daily     Last Filled:  9/25/2023    Last appt: 5/23/2023   Next appt: 12/4/2023    Last OARRS:        No data to display

## 2023-11-27 NOTE — TELEPHONE ENCOUNTER
Medication:   Requested Prescriptions     Pending Prescriptions Disp Refills    pioglitazone (ACTOS) 15 MG tablet [Pharmacy Med Name: Pioglitazone HCl Oral Tablet 15 MG] 30 tablet 0     Sig: TAKE 1 TABLET BY MOUTH EVERY DAY     Last Filled:  9/25/2023    Last appt: 5/23/2023   Next appt: Visit date not found    Last Labs DM:   Lab Results   Component Value Date/Time    LABA1C 6.0 01/05/2023 02:21 PM     Last Lipid:   Lab Results   Component Value Date/Time    CHOL 220 01/05/2023 02:21 PM    TRIG 118 01/05/2023 02:21 PM    HDL 50 01/05/2023 02:21 PM    [address] [facility] 146 01/05/2023 02:21 PM     Last PSA: No results found for: \"PSA\"  Last Thyroid:   Lab Results   Component Value Date/Time    TSH 1.44 01/05/2023 02:21 PM

## 2023-12-04 ENCOUNTER — OFFICE VISIT (OUTPATIENT)
Dept: PRIMARY CARE CLINIC | Age: 48
End: 2023-12-04
Payer: MEDICAID

## 2023-12-04 VITALS
TEMPERATURE: 97.5 F | RESPIRATION RATE: 12 BRPM | HEART RATE: 65 BPM | SYSTOLIC BLOOD PRESSURE: 110 MMHG | OXYGEN SATURATION: 99 % | DIASTOLIC BLOOD PRESSURE: 82 MMHG | BODY MASS INDEX: 40.82 KG/M2 | WEIGHT: 209 LBS

## 2023-12-04 DIAGNOSIS — Z71.3 NUTRITIONAL COUNSELING: ICD-10-CM

## 2023-12-04 DIAGNOSIS — R73.9 HYPERGLYCEMIA: Primary | ICD-10-CM

## 2023-12-04 DIAGNOSIS — G89.29 CHRONIC PAIN OF RIGHT ANKLE: ICD-10-CM

## 2023-12-04 DIAGNOSIS — E11.9 TYPE 2 DIABETES MELLITUS WITHOUT COMPLICATION, WITHOUT LONG-TERM CURRENT USE OF INSULIN (HCC): ICD-10-CM

## 2023-12-04 DIAGNOSIS — M25.571 CHRONIC PAIN OF RIGHT ANKLE: ICD-10-CM

## 2023-12-04 LAB
BASOPHILS # BLD: 0 K/UL (ref 0–0.2)
BASOPHILS NFR BLD: 0.5 %
DEPRECATED RDW RBC AUTO: 14.1 % (ref 12.4–15.4)
EOSINOPHIL # BLD: 0.1 K/UL (ref 0–0.6)
EOSINOPHIL NFR BLD: 1.7 %
HCT VFR BLD AUTO: 38.4 % (ref 36–48)
HGB BLD-MCNC: 12.8 G/DL (ref 12–16)
LYMPHOCYTES # BLD: 1.7 K/UL (ref 1–5.1)
LYMPHOCYTES NFR BLD: 28 %
MCH RBC QN AUTO: 30.1 PG (ref 26–34)
MCHC RBC AUTO-ENTMCNC: 33.4 G/DL (ref 31–36)
MCV RBC AUTO: 90.3 FL (ref 80–100)
MONOCYTES # BLD: 0.4 K/UL (ref 0–1.3)
MONOCYTES NFR BLD: 6 %
NEUTROPHILS # BLD: 3.8 K/UL (ref 1.7–7.7)
NEUTROPHILS NFR BLD: 63.8 %
PLATELET # BLD AUTO: 220 K/UL (ref 135–450)
PMV BLD AUTO: 9.3 FL (ref 5–10.5)
RBC # BLD AUTO: 4.25 M/UL (ref 4–5.2)
WBC # BLD AUTO: 6 K/UL (ref 4–11)

## 2023-12-04 PROCEDURE — 1036F TOBACCO NON-USER: CPT | Performed by: FAMILY MEDICINE

## 2023-12-04 PROCEDURE — 3017F COLORECTAL CA SCREEN DOC REV: CPT | Performed by: FAMILY MEDICINE

## 2023-12-04 PROCEDURE — G8484 FLU IMMUNIZE NO ADMIN: HCPCS | Performed by: FAMILY MEDICINE

## 2023-12-04 PROCEDURE — G8427 DOCREV CUR MEDS BY ELIG CLIN: HCPCS | Performed by: FAMILY MEDICINE

## 2023-12-04 PROCEDURE — G8417 CALC BMI ABV UP PARAM F/U: HCPCS | Performed by: FAMILY MEDICINE

## 2023-12-04 PROCEDURE — 99214 OFFICE O/P EST MOD 30 MIN: CPT | Performed by: FAMILY MEDICINE

## 2023-12-04 RX ORDER — CELECOXIB 200 MG/1
200 CAPSULE ORAL 2 TIMES DAILY
Qty: 60 CAPSULE | Refills: 1 | Status: SHIPPED | OUTPATIENT
Start: 2023-12-04

## 2023-12-04 ASSESSMENT — ENCOUNTER SYMPTOMS
RESPIRATORY NEGATIVE: 1
EYES NEGATIVE: 1
GASTROINTESTINAL NEGATIVE: 1

## 2023-12-05 LAB
ALBUMIN SERPL-MCNC: 4 G/DL (ref 3.4–5)
ALP SERPL-CCNC: 74 U/L (ref 40–129)
ALT SERPL-CCNC: 21 U/L (ref 10–40)
ANION GAP SERPL CALCULATED.3IONS-SCNC: 10 MMOL/L (ref 3–16)
AST SERPL-CCNC: 25 U/L (ref 15–37)
BILIRUB DIRECT SERPL-MCNC: <0.2 MG/DL (ref 0–0.3)
BILIRUB INDIRECT SERPL-MCNC: NORMAL MG/DL (ref 0–1)
BILIRUB SERPL-MCNC: 0.3 MG/DL (ref 0–1)
BUN SERPL-MCNC: 17 MG/DL (ref 7–20)
CALCIUM SERPL-MCNC: 8.7 MG/DL (ref 8.3–10.6)
CHLORIDE SERPL-SCNC: 106 MMOL/L (ref 99–110)
CHOLEST SERPL-MCNC: 192 MG/DL (ref 0–199)
CO2 SERPL-SCNC: 24 MMOL/L (ref 21–32)
CREAT SERPL-MCNC: <0.5 MG/DL (ref 0.6–1.1)
EST. AVERAGE GLUCOSE BLD GHB EST-MCNC: 128.4 MG/DL
GFR SERPLBLD CREATININE-BSD FMLA CKD-EPI: >60 ML/MIN/{1.73_M2}
GLUCOSE SERPL-MCNC: 114 MG/DL (ref 70–99)
HBA1C MFR BLD: 6.1 %
HDLC SERPL-MCNC: 54 MG/DL (ref 40–60)
LDLC SERPL CALC-MCNC: 113 MG/DL
POTASSIUM SERPL-SCNC: 4.4 MMOL/L (ref 3.5–5.1)
PROT SERPL-MCNC: 7 G/DL (ref 6.4–8.2)
SODIUM SERPL-SCNC: 140 MMOL/L (ref 136–145)
TRIGL SERPL-MCNC: 124 MG/DL (ref 0–150)
TSH SERPL DL<=0.005 MIU/L-ACNC: 2.15 UIU/ML (ref 0.27–4.2)
VLDLC SERPL CALC-MCNC: 25 MG/DL

## 2024-06-21 DIAGNOSIS — R73.9 HYPERGLYCEMIA: ICD-10-CM

## 2024-06-21 DIAGNOSIS — E11.9 TYPE 2 DIABETES MELLITUS WITHOUT COMPLICATION, WITHOUT LONG-TERM CURRENT USE OF INSULIN (HCC): ICD-10-CM

## 2024-06-21 RX ORDER — ATORVASTATIN CALCIUM 20 MG/1
20 TABLET, FILM COATED ORAL DAILY
Qty: 90 TABLET | Refills: 0 | Status: SHIPPED | OUTPATIENT
Start: 2024-06-21

## 2024-06-21 NOTE — TELEPHONE ENCOUNTER
Medication:   Requested Prescriptions     Pending Prescriptions Disp Refills    atorvastatin (LIPITOR) 20 MG tablet [Pharmacy Med Name: Atorvastatin Calcium Oral Tablet 20 MG] 90 tablet 0     Sig: TAKE 1 TABLET BY MOUTH EVERY DAY    metFORMIN (GLUCOPHAGE) 500 MG tablet [Pharmacy Med Name: metFORMIN HCl Oral Tablet 500 MG] 180 tablet 0     Sig: TAKE 1 TABLET BY MOUTH 2 TIMES A DAY WITH MEALS     Last Filled:  atorvastatin - 11/27/2023  Metformin - 12/4/2023    Last appt: 12/4/2023   Next appt: Visit date not found    Last Labs DM:   Lab Results   Component Value Date/Time    LABA1C 6.1 12/04/2023 01:25 PM     Last Lipid:   Lab Results   Component Value Date/Time    CHOL 192 12/04/2023 01:25 PM    TRIG 124 12/04/2023 01:25 PM    HDL 54 12/04/2023 01:25 PM     Last PSA: No results found for: \"PSA\"  Last Thyroid:   Lab Results   Component Value Date/Time    TSH 2.15 12/04/2023 01:25 PM

## 2024-08-05 LAB
DIABETIC RETINOPATHY: NEGATIVE
DIABETIC RETINOPATHY: NEGATIVE

## 2024-10-03 DIAGNOSIS — E11.9 TYPE 2 DIABETES MELLITUS WITHOUT COMPLICATION, WITHOUT LONG-TERM CURRENT USE OF INSULIN (HCC): ICD-10-CM

## 2024-10-03 DIAGNOSIS — R73.9 HYPERGLYCEMIA: ICD-10-CM

## 2024-10-03 NOTE — TELEPHONE ENCOUNTER
Medication:   Requested Prescriptions     Pending Prescriptions Disp Refills    metFORMIN (GLUCOPHAGE) 500 MG tablet [Pharmacy Med Name: metFORMIN HCl Oral Tablet 500 MG] 180 tablet 0     Sig: TAKE 1 TABLET BY MOUTH 2 TIMES A DAY WITH MEALS    atorvastatin (LIPITOR) 20 MG tablet [Pharmacy Med Name: Atorvastatin Calcium Oral Tablet 20 MG] 90 tablet 0     Sig: TAKE 1 TABLET BY MOUTH EVERY DAY     Last Filled:  6/21/2024    Last appt: 12/4/2023   Next appt: 10/4/2024    Last Labs DM:   Lab Results   Component Value Date/Time    LABA1C 6.1 12/04/2023 01:25 PM     Last Lipid:   Lab Results   Component Value Date/Time    CHOL 192 12/04/2023 01:25 PM    TRIG 124 12/04/2023 01:25 PM    HDL 54 12/04/2023 01:25 PM     Last PSA: No results found for: \"PSA\"  Last Thyroid:   Lab Results   Component Value Date/Time    TSH 2.15 12/04/2023 01:25 PM

## 2024-10-04 RX ORDER — ATORVASTATIN CALCIUM 20 MG/1
20 TABLET, FILM COATED ORAL DAILY
Qty: 90 TABLET | Refills: 0 | Status: SHIPPED | OUTPATIENT
Start: 2024-10-04

## 2024-10-08 ENCOUNTER — OFFICE VISIT (OUTPATIENT)
Dept: PRIMARY CARE CLINIC | Age: 49
End: 2024-10-08
Payer: MEDICAID

## 2024-10-08 VITALS
OXYGEN SATURATION: 98 % | SYSTOLIC BLOOD PRESSURE: 130 MMHG | BODY MASS INDEX: 42.01 KG/M2 | HEART RATE: 90 BPM | WEIGHT: 214 LBS | DIASTOLIC BLOOD PRESSURE: 80 MMHG | HEIGHT: 60 IN

## 2024-10-08 DIAGNOSIS — R00.2 PALPITATIONS: ICD-10-CM

## 2024-10-08 DIAGNOSIS — E11.9 TYPE 2 DIABETES MELLITUS WITHOUT COMPLICATION, WITHOUT LONG-TERM CURRENT USE OF INSULIN (HCC): Primary | ICD-10-CM

## 2024-10-08 DIAGNOSIS — F41.9 ANXIETY: ICD-10-CM

## 2024-10-08 LAB
CHP ED QC CHECK: NORMAL
GLUCOSE BLD-MCNC: 127 MG/DL
HBA1C MFR BLD: 6.9 %

## 2024-10-08 PROCEDURE — 82962 GLUCOSE BLOOD TEST: CPT | Performed by: FAMILY MEDICINE

## 2024-10-08 PROCEDURE — 2022F DILAT RTA XM EVC RTNOPTHY: CPT | Performed by: FAMILY MEDICINE

## 2024-10-08 PROCEDURE — G8427 DOCREV CUR MEDS BY ELIG CLIN: HCPCS | Performed by: FAMILY MEDICINE

## 2024-10-08 PROCEDURE — 1036F TOBACCO NON-USER: CPT | Performed by: FAMILY MEDICINE

## 2024-10-08 PROCEDURE — 99214 OFFICE O/P EST MOD 30 MIN: CPT | Performed by: FAMILY MEDICINE

## 2024-10-08 PROCEDURE — 3017F COLORECTAL CA SCREEN DOC REV: CPT | Performed by: FAMILY MEDICINE

## 2024-10-08 PROCEDURE — 93000 ELECTROCARDIOGRAM COMPLETE: CPT | Performed by: FAMILY MEDICINE

## 2024-10-08 PROCEDURE — 83036 HEMOGLOBIN GLYCOSYLATED A1C: CPT | Performed by: FAMILY MEDICINE

## 2024-10-08 PROCEDURE — 3044F HG A1C LEVEL LT 7.0%: CPT | Performed by: FAMILY MEDICINE

## 2024-10-08 PROCEDURE — G8417 CALC BMI ABV UP PARAM F/U: HCPCS | Performed by: FAMILY MEDICINE

## 2024-10-08 PROCEDURE — G8484 FLU IMMUNIZE NO ADMIN: HCPCS | Performed by: FAMILY MEDICINE

## 2024-10-08 RX ORDER — HYDROXYZINE HYDROCHLORIDE 25 MG/1
25 TABLET, FILM COATED ORAL NIGHTLY
Qty: 30 TABLET | Refills: 0 | Status: SHIPPED | OUTPATIENT
Start: 2024-10-08 | End: 2024-11-07

## 2024-10-08 RX ORDER — ESCITALOPRAM OXALATE 10 MG/1
10 TABLET ORAL DAILY
Qty: 30 TABLET | Refills: 3 | Status: SHIPPED | OUTPATIENT
Start: 2024-10-08

## 2024-10-08 SDOH — ECONOMIC STABILITY: INCOME INSECURITY: HOW HARD IS IT FOR YOU TO PAY FOR THE VERY BASICS LIKE FOOD, HOUSING, MEDICAL CARE, AND HEATING?: NOT HARD AT ALL

## 2024-10-08 SDOH — ECONOMIC STABILITY: FOOD INSECURITY: WITHIN THE PAST 12 MONTHS, THE FOOD YOU BOUGHT JUST DIDN'T LAST AND YOU DIDN'T HAVE MONEY TO GET MORE.: NEVER TRUE

## 2024-10-08 SDOH — ECONOMIC STABILITY: FOOD INSECURITY: WITHIN THE PAST 12 MONTHS, YOU WORRIED THAT YOUR FOOD WOULD RUN OUT BEFORE YOU GOT MONEY TO BUY MORE.: NEVER TRUE

## 2024-10-08 ASSESSMENT — PATIENT HEALTH QUESTIONNAIRE - PHQ9
SUM OF ALL RESPONSES TO PHQ QUESTIONS 1-9: 0
1. LITTLE INTEREST OR PLEASURE IN DOING THINGS: NOT AT ALL
SUM OF ALL RESPONSES TO PHQ9 QUESTIONS 1 & 2: 0
SUM OF ALL RESPONSES TO PHQ QUESTIONS 1-9: 0
2. FEELING DOWN, DEPRESSED OR HOPELESS: NOT AT ALL
SUM OF ALL RESPONSES TO PHQ QUESTIONS 1-9: 0
SUM OF ALL RESPONSES TO PHQ QUESTIONS 1-9: 0

## 2024-10-08 ASSESSMENT — ENCOUNTER SYMPTOMS
RESPIRATORY NEGATIVE: 1
EYES NEGATIVE: 1
GASTROINTESTINAL NEGATIVE: 1

## 2024-10-14 DIAGNOSIS — E11.9 TYPE 2 DIABETES MELLITUS WITHOUT COMPLICATION, WITHOUT LONG-TERM CURRENT USE OF INSULIN (HCC): ICD-10-CM

## 2024-10-14 LAB
ALBUMIN SERPL-MCNC: 4 G/DL (ref 3.4–5)
ALP SERPL-CCNC: 68 U/L (ref 40–129)
ALT SERPL-CCNC: 15 U/L (ref 10–40)
ANION GAP SERPL CALCULATED.3IONS-SCNC: 12 MMOL/L (ref 3–16)
AST SERPL-CCNC: 17 U/L (ref 15–37)
BASOPHILS # BLD: 0 K/UL (ref 0–0.2)
BASOPHILS NFR BLD: 0.3 %
BILIRUB DIRECT SERPL-MCNC: <0.1 MG/DL (ref 0–0.3)
BILIRUB INDIRECT SERPL-MCNC: 0.2 MG/DL (ref 0–1)
BILIRUB SERPL-MCNC: 0.3 MG/DL (ref 0–1)
BUN SERPL-MCNC: 15 MG/DL (ref 7–20)
CALCIUM SERPL-MCNC: 8.6 MG/DL (ref 8.3–10.6)
CHLORIDE SERPL-SCNC: 106 MMOL/L (ref 99–110)
CHOLEST SERPL-MCNC: 157 MG/DL (ref 0–199)
CO2 SERPL-SCNC: 21 MMOL/L (ref 21–32)
CREAT SERPL-MCNC: 0.5 MG/DL (ref 0.6–1.1)
DEPRECATED RDW RBC AUTO: 13.8 % (ref 12.4–15.4)
EOSINOPHIL # BLD: 0.1 K/UL (ref 0–0.6)
EOSINOPHIL NFR BLD: 1.1 %
EST. AVERAGE GLUCOSE BLD GHB EST-MCNC: 142.7 MG/DL
FOLATE SERPL-MCNC: 15.6 NG/ML (ref 4.78–24.2)
GFR SERPLBLD CREATININE-BSD FMLA CKD-EPI: >90 ML/MIN/{1.73_M2}
GLUCOSE SERPL-MCNC: 122 MG/DL (ref 70–99)
HBA1C MFR BLD: 6.6 %
HCT VFR BLD AUTO: 38.9 % (ref 36–48)
HDLC SERPL-MCNC: 52 MG/DL (ref 40–60)
HGB BLD-MCNC: 12.7 G/DL (ref 12–16)
LDLC SERPL CALC-MCNC: 86 MG/DL
LYMPHOCYTES # BLD: 1.6 K/UL (ref 1–5.1)
LYMPHOCYTES NFR BLD: 26.1 %
MCH RBC QN AUTO: 28.8 PG (ref 26–34)
MCHC RBC AUTO-ENTMCNC: 32.7 G/DL (ref 31–36)
MCV RBC AUTO: 87.9 FL (ref 80–100)
MONOCYTES # BLD: 0.3 K/UL (ref 0–1.3)
MONOCYTES NFR BLD: 4.7 %
NEUTROPHILS # BLD: 4.2 K/UL (ref 1.7–7.7)
NEUTROPHILS NFR BLD: 67.8 %
PLATELET # BLD AUTO: 223 K/UL (ref 135–450)
PMV BLD AUTO: 8.9 FL (ref 5–10.5)
POTASSIUM SERPL-SCNC: 4.4 MMOL/L (ref 3.5–5.1)
PROT SERPL-MCNC: 6.6 G/DL (ref 6.4–8.2)
RBC # BLD AUTO: 4.42 M/UL (ref 4–5.2)
SODIUM SERPL-SCNC: 139 MMOL/L (ref 136–145)
TRIGL SERPL-MCNC: 95 MG/DL (ref 0–150)
TSH SERPL DL<=0.005 MIU/L-ACNC: 2.1 UIU/ML (ref 0.27–4.2)
VIT B12 SERPL-MCNC: 656 PG/ML (ref 211–911)
VLDLC SERPL CALC-MCNC: 19 MG/DL
WBC # BLD AUTO: 6.1 K/UL (ref 4–11)

## 2024-12-10 ENCOUNTER — OFFICE VISIT (OUTPATIENT)
Dept: PRIMARY CARE CLINIC | Age: 49
End: 2024-12-10
Payer: MEDICAID

## 2024-12-10 VITALS
BODY MASS INDEX: 43.11 KG/M2 | SYSTOLIC BLOOD PRESSURE: 138 MMHG | OXYGEN SATURATION: 97 % | HEART RATE: 97 BPM | WEIGHT: 219.6 LBS | DIASTOLIC BLOOD PRESSURE: 74 MMHG | HEIGHT: 60 IN

## 2024-12-10 DIAGNOSIS — R73.9 HYPERGLYCEMIA: ICD-10-CM

## 2024-12-10 DIAGNOSIS — N39.0 URINARY TRACT INFECTION WITH HEMATURIA, SITE UNSPECIFIED: ICD-10-CM

## 2024-12-10 DIAGNOSIS — R31.9 URINARY TRACT INFECTION WITH HEMATURIA, SITE UNSPECIFIED: ICD-10-CM

## 2024-12-10 DIAGNOSIS — E11.9 TYPE 2 DIABETES MELLITUS WITHOUT COMPLICATION, WITHOUT LONG-TERM CURRENT USE OF INSULIN (HCC): Primary | ICD-10-CM

## 2024-12-10 DIAGNOSIS — M79.641 RIGHT HAND PAIN: ICD-10-CM

## 2024-12-10 LAB
BILIRUBIN, POC: NORMAL
BLOOD URINE, POC: NORMAL
CLARITY, POC: CLEAR
COLOR, POC: YELLOW
GLUCOSE URINE, POC: NORMAL MG/DL
KETONES, POC: NORMAL MG/DL
LEUKOCYTE EST, POC: NORMAL
NITRITE, POC: NORMAL
PH, POC: 5.5
PROTEIN, POC: NORMAL MG/DL
SPECIFIC GRAVITY, POC: >=1.03
UROBILINOGEN, POC: 0.2 MG/DL

## 2024-12-10 PROCEDURE — 3017F COLORECTAL CA SCREEN DOC REV: CPT | Performed by: FAMILY MEDICINE

## 2024-12-10 PROCEDURE — 2022F DILAT RTA XM EVC RTNOPTHY: CPT | Performed by: FAMILY MEDICINE

## 2024-12-10 PROCEDURE — G8427 DOCREV CUR MEDS BY ELIG CLIN: HCPCS | Performed by: FAMILY MEDICINE

## 2024-12-10 PROCEDURE — 81002 URINALYSIS NONAUTO W/O SCOPE: CPT | Performed by: FAMILY MEDICINE

## 2024-12-10 PROCEDURE — G8417 CALC BMI ABV UP PARAM F/U: HCPCS | Performed by: FAMILY MEDICINE

## 2024-12-10 PROCEDURE — 3044F HG A1C LEVEL LT 7.0%: CPT | Performed by: FAMILY MEDICINE

## 2024-12-10 PROCEDURE — 1036F TOBACCO NON-USER: CPT | Performed by: FAMILY MEDICINE

## 2024-12-10 PROCEDURE — G8484 FLU IMMUNIZE NO ADMIN: HCPCS | Performed by: FAMILY MEDICINE

## 2024-12-10 PROCEDURE — 99214 OFFICE O/P EST MOD 30 MIN: CPT | Performed by: FAMILY MEDICINE

## 2024-12-10 RX ORDER — CIPROFLOXACIN 250 MG/1
250 TABLET, FILM COATED ORAL 2 TIMES DAILY
Qty: 14 TABLET | Refills: 0 | Status: SHIPPED | OUTPATIENT
Start: 2024-12-10 | End: 2024-12-17

## 2024-12-10 ASSESSMENT — ENCOUNTER SYMPTOMS
RESPIRATORY NEGATIVE: 1
GASTROINTESTINAL NEGATIVE: 1
EYES NEGATIVE: 1

## 2024-12-10 NOTE — PROGRESS NOTES
dysuria, frequency and urgency.   Psychiatric/Behavioral:  Negative for agitation, behavioral problems, confusion, decreased concentration, dysphoric mood, hallucinations, self-injury and sleep disturbance. The patient is not nervous/anxious.    All other systems reviewed and are negative.      OBJECTIVE:  /74   Pulse 97   Ht 1.524 m (5')   Wt 99.6 kg (219 lb 9.6 oz)   SpO2 97%   BMI 42.89 kg/m²     Physical Exam  Vitals reviewed.   HENT:      Head: Normocephalic and atraumatic.      Right Ear: Tympanic membrane and ear canal normal.      Left Ear: Tympanic membrane and ear canal normal.      Nose: Nose normal.   Eyes:      General: No scleral icterus.     Conjunctiva/sclera: Conjunctivae normal.   Neck:      Thyroid: No thyromegaly.      Vascular: No JVD.   Cardiovascular:      Rate and Rhythm: Normal rate and regular rhythm.      Heart sounds: Normal heart sounds. No murmur heard.     No friction rub. No gallop.   Pulmonary:      Effort: Pulmonary effort is normal.      Breath sounds: Normal breath sounds. No wheezing or rales.   Abdominal:      General: Bowel sounds are normal. There is no distension.      Palpations: Abdomen is soft. There is no mass.      Tenderness: There is no abdominal tenderness.      Hernia: No hernia is present.   Musculoskeletal:      Cervical back: Normal range of motion.   Lymphadenopathy:      Cervical: No cervical adenopathy.   Skin:     Findings: No rash.   Neurological:      Mental Status: She is alert and oriented to person, place, and time.         ASSESSMENT:  1. Type 2 diabetes mellitus without complication, without long-term current use of insulin (HCC)  Comments:  see orders  check FBW  Orders:  -     Basic Metabolic Panel; Future  -     CBC; Future  -     TSH; Future  -     Lipid Panel; Future  -     Hepatic Function Panel; Future  -     Hemoglobin A1C; Future  2. Urinary tract infection with hematuria, site unspecified  Comments:  see orders   increase

## 2024-12-11 LAB — BACTERIA UR CULT: NORMAL

## 2024-12-11 NOTE — TELEPHONE ENCOUNTER
Medication:   Requested Prescriptions     Pending Prescriptions Disp Refills    metFORMIN (GLUCOPHAGE) 500 MG tablet [Pharmacy Med Name: metFORMIN HCl Oral Tablet 500 MG] 180 tablet 0     Sig: TAKE 1 TABLET BY MOUTH 2 TIMES A DAY WITH MEALS     Last Filled:  10.4.24    Last appt: 12/10/2024   Next appt: Visit date not found    Last Labs DM:   Lab Results   Component Value Date/Time    LABA1C 6.6 10/14/2024 12:36 PM

## 2024-12-12 ENCOUNTER — TELEPHONE (OUTPATIENT)
Dept: PRIMARY CARE CLINIC | Age: 49
End: 2024-12-12

## 2024-12-12 NOTE — TELEPHONE ENCOUNTER
----- Message from Reginaldo ARTEAGA sent at 12/12/2024 11:59 AM EST -----  Regarding: ECC Referral Request  ECC Referral Request    Reason for referral request: Specialty Provider    Specialist/Lab/Test patient is requesting (if known):    Specialist Phone Number (if applicable): [phone]    Additional Information: Need a referral to send to another hospital ( [facility] )  Faxed Number: [phone]  --------------------------------------------------------------------------------------------------------------------------    Relationship to Patient: Tabatha Dirk ( Son )     Call Back Information: OK to leave message on voicemail  Preferred Call Back Number: Phone [phone]

## 2024-12-12 NOTE — TELEPHONE ENCOUNTER
----- Message from Reginaldo ARTEAGA sent at 12/12/2024 11:59 AM EST -----  Regarding: ECC Referral Request  ECC Referral Request    Reason for referral request: Specialty Provider    Specialist/Lab/Test patient is requesting (if known):    Specialist Phone Number (if applicable): [phone]    Additional Information: Need a referral to send to another hospital ( [facility] )  Faxed Number: [phone]  --------------------------------------------------------------------------------------------------------------------------    Relationship to Patient: Tabatha Dirk ( Son )     Call Back Information: OK to leave message on voicemail  Preferred Call Back Number: Phone [phone]

## 2024-12-13 NOTE — TELEPHONE ENCOUNTER
Pt son states pt was seen on 12/10 for flank pain she is still experiencing kidney  pain and would like a referral for kidney specialist

## 2024-12-16 DIAGNOSIS — M54.50 CHRONIC BILATERAL LOW BACK PAIN WITHOUT SCIATICA: Primary | ICD-10-CM

## 2024-12-16 DIAGNOSIS — G89.29 CHRONIC BILATERAL LOW BACK PAIN WITHOUT SCIATICA: Primary | ICD-10-CM

## 2024-12-16 NOTE — TELEPHONE ENCOUNTER
The pain she is experiencing not related to her kidney  She has a normal kidney function  I did put an order to an x-ray of her back which most likely the cause of her pain

## 2024-12-20 ENCOUNTER — HOSPITAL ENCOUNTER (OUTPATIENT)
Dept: GENERAL RADIOLOGY | Age: 49
Discharge: HOME | End: 2024-12-20
Payer: MEDICAID

## 2024-12-20 DIAGNOSIS — M54.50 CHRONIC BILATERAL LOW BACK PAIN WITHOUT SCIATICA: Primary | ICD-10-CM

## 2024-12-20 DIAGNOSIS — G89.29 CHRONIC BILATERAL LOW BACK PAIN WITHOUT SCIATICA: Primary | ICD-10-CM

## 2024-12-20 DIAGNOSIS — G89.29 CHRONIC BILATERAL LOW BACK PAIN WITHOUT SCIATICA: ICD-10-CM

## 2024-12-20 DIAGNOSIS — E11.9 TYPE 2 DIABETES MELLITUS WITHOUT COMPLICATION, WITHOUT LONG-TERM CURRENT USE OF INSULIN (HCC): ICD-10-CM

## 2024-12-20 DIAGNOSIS — M54.50 CHRONIC BILATERAL LOW BACK PAIN WITHOUT SCIATICA: ICD-10-CM

## 2024-12-20 LAB
ALBUMIN SERPL-MCNC: 4 G/DL (ref 3.4–5)
ALP SERPL-CCNC: 76 U/L (ref 40–129)
ALT SERPL-CCNC: 12 U/L (ref 10–40)
ANION GAP SERPL CALCULATED.3IONS-SCNC: 10 MMOL/L (ref 3–16)
AST SERPL-CCNC: 16 U/L (ref 15–37)
BILIRUB DIRECT SERPL-MCNC: <0.1 MG/DL (ref 0–0.3)
BILIRUB INDIRECT SERPL-MCNC: NORMAL MG/DL (ref 0–1)
BILIRUB SERPL-MCNC: <0.2 MG/DL (ref 0–1)
BUN SERPL-MCNC: 17 MG/DL (ref 7–20)
CALCIUM SERPL-MCNC: 8.7 MG/DL (ref 8.3–10.6)
CHLORIDE SERPL-SCNC: 107 MMOL/L (ref 99–110)
CHOLEST SERPL-MCNC: 171 MG/DL (ref 0–199)
CO2 SERPL-SCNC: 22 MMOL/L (ref 21–32)
CREAT SERPL-MCNC: 0.5 MG/DL (ref 0.6–1.1)
DEPRECATED RDW RBC AUTO: 13.3 % (ref 12.4–15.4)
GFR SERPLBLD CREATININE-BSD FMLA CKD-EPI: >90 ML/MIN/{1.73_M2}
GLUCOSE SERPL-MCNC: 143 MG/DL (ref 70–99)
HCT VFR BLD AUTO: 38.7 % (ref 36–48)
HDLC SERPL-MCNC: 47 MG/DL (ref 40–60)
HGB BLD-MCNC: 12.9 G/DL (ref 12–16)
LDLC SERPL CALC-MCNC: 104 MG/DL
MCH RBC QN AUTO: 28.8 PG (ref 26–34)
MCHC RBC AUTO-ENTMCNC: 33.3 G/DL (ref 31–36)
MCV RBC AUTO: 86.6 FL (ref 80–100)
PLATELET # BLD AUTO: 233 K/UL (ref 135–450)
PMV BLD AUTO: 9.1 FL (ref 5–10.5)
POTASSIUM SERPL-SCNC: 4.3 MMOL/L (ref 3.5–5.1)
PROT SERPL-MCNC: 6.8 G/DL (ref 6.4–8.2)
RBC # BLD AUTO: 4.47 M/UL (ref 4–5.2)
SODIUM SERPL-SCNC: 139 MMOL/L (ref 136–145)
TRIGL SERPL-MCNC: 98 MG/DL (ref 0–150)
TSH SERPL DL<=0.005 MIU/L-ACNC: 1.68 UIU/ML (ref 0.27–4.2)
VLDLC SERPL CALC-MCNC: 20 MG/DL
WBC # BLD AUTO: 5.1 K/UL (ref 4–11)

## 2024-12-20 PROCEDURE — 72070 X-RAY EXAM THORAC SPINE 2VWS: CPT

## 2024-12-20 PROCEDURE — 72100 X-RAY EXAM L-S SPINE 2/3 VWS: CPT

## 2024-12-21 LAB
EST. AVERAGE GLUCOSE BLD GHB EST-MCNC: 148.5 MG/DL
HBA1C MFR BLD: 6.8 %

## 2025-01-15 ENCOUNTER — OFFICE VISIT (OUTPATIENT)
Dept: ORTHOPEDIC SURGERY | Age: 50
End: 2025-01-15

## 2025-01-15 VITALS — RESPIRATION RATE: 16 BRPM | BODY MASS INDEX: 43 KG/M2 | HEIGHT: 60 IN | WEIGHT: 219 LBS

## 2025-01-15 DIAGNOSIS — M65.30 TRIGGER FINGER, ACQUIRED: Primary | ICD-10-CM

## 2025-01-15 RX ORDER — BUPIVACAINE HYDROCHLORIDE 2.5 MG/ML
0.5 INJECTION, SOLUTION INFILTRATION; PERINEURAL ONCE
Status: COMPLETED | OUTPATIENT
Start: 2025-01-15 | End: 2025-01-16

## 2025-01-15 RX ORDER — TRIAMCINOLONE ACETONIDE 40 MG/ML
20 INJECTION, SUSPENSION INTRA-ARTICULAR; INTRAMUSCULAR ONCE
Status: COMPLETED | OUTPATIENT
Start: 2025-01-15 | End: 2025-01-16

## 2025-01-15 NOTE — PATIENT INSTRUCTIONS
Information & Instructions   After Finger, Hand, Wrist, or Elbow Injection    Sher Dugan MD    You have received an injection of local anesthetic (Bupivicaine without Epinephrine) for comfort & a steroid (Kenalog) for it’s strong anti-inflammatory effects. In order to give the medication a chance to reduce your inflammation and discomfort, it is recommended that you take it easy for a day or so.  You may use your hand and arm as you feel comfortable, but you should avoid highly strenuous activity and heavy use for several days.    Relief from the injection will often not begin for several days, and you may not feel full relief for up to one month.      It is not uncommon to experience some local discomfort or pain at or around the injection site for a few days.  To relieve these symptoms you may do the following if you feel necessary:       Apply ice to the affected area 20 minutes on and 20 minutes off.   Do not apply ice directly to the skin. Use a thin layer (T-shirt, pillowcase, towel, etc.) to protect the skin.     - If allowed by your other medical physicians, you may take -     2 Tylenol extra strength tablets every 4-6 hours       1-2 Aleve tablets twice a day     2-3 Advil tablets two to three times a day    If you are diabetic, the steroid medication may increase your blood sugar, so you are advised to monitor your sugar more closely so you can adjust it accordingly for a few days following your injection.  If you need assistance with the control of you blood sugar, please contact you primary care physician for further advice.    I will request that you please call the office one month after your injection at [phone] if you have not experienced relief of your symptoms (unless I have instructed you otherwise).  If your injection has given you good relief of you symptoms as expected, then you only need to call the office if your symptoms return.

## 2025-01-15 NOTE — PROGRESS NOTES
considerations and the appropriate expectations for this injection. I have clearly explained to her that the above outlined treatment plan should not be expected to 'cure' her stenosing tenosynovitis, but we are rather treating the symptoms with which she presents.  She has understood that in order to achieve long lasting relief of her symptoms and to prevent future worsening or further damage, that definitive surgical treatment would be required. Ms. Nasima Muniz  voiced an appropriate understanding of our discussion, the options available to her, and of the expectations of her selected  treatment. She did wish to proceed with Right Thumb Flexor Tendon Sheath injection.    Procedure:  right Thumb Trigger Finger Injection  [first Injection]:  After full discussion of the nature of this process and outlining a treatment plan with Ms. Nasima Muniz, we discussed the complications, limitations, expectations, alternatives, and risks of injection of the flexor tendon sheath.   I have explained the potential for bleeding, infection, potential side effects of the medication, and the remote possibility of damage to surrounding structures as result of the injection.  She understood this information well and verbally consented to this treatment.    The skin of the symptomatic digit was prepped with Isopropyl Alcohol and under aseptic conditions the flexor tendon sheath was injected with a combination of 1/2 ml of 0.25% Bupivacaine without Epinephrine and 20 mg of Triamcinolone (40 mg/ml).   Good filling of the flexor sheath was noted.   A dry sterile bandage was applied and the patient tolerated the injection without difficulty.      I advised the patient of the expected response, possible reactions and the instructions for care of the hand.   She is instructed in the judicious use of over-the-counter anti-inflammatory medications or pain relievers for her symptoms if allowed by his primary care physician.      I have also

## 2025-01-16 RX ADMIN — BUPIVACAINE HYDROCHLORIDE 1.25 MG: 2.5 INJECTION, SOLUTION INFILTRATION; PERINEURAL at 10:56

## 2025-01-16 RX ADMIN — TRIAMCINOLONE ACETONIDE 20 MG: 40 INJECTION, SUSPENSION INTRA-ARTICULAR; INTRAMUSCULAR at 10:57

## 2025-02-06 ENCOUNTER — HOSPITAL ENCOUNTER (OUTPATIENT)
Dept: PHYSICAL THERAPY | Age: 50
Setting detail: THERAPIES SERIES
Discharge: HOME | End: 2025-02-06
Payer: MEDICAID

## 2025-02-06 PROCEDURE — 97161 PT EVAL LOW COMPLEX 20 MIN: CPT | Performed by: PHYSICAL THERAPIST

## 2025-02-06 PROCEDURE — 97530 THERAPEUTIC ACTIVITIES: CPT | Performed by: PHYSICAL THERAPIST

## 2025-02-06 PROCEDURE — 97110 THERAPEUTIC EXERCISES: CPT | Performed by: PHYSICAL THERAPIST

## 2025-02-06 NOTE — PLAN OF CARE
[facility] - Outpatient Rehabilitation and Therapy: [address] [place] [address] office: [phone] fax: [phone]     Physical Therapy Initial Evaluation Certification      Dear Pedro Stone MD ,    We had the pleasure of evaluating the following patient for physical therapy services at [facility].  A summary of our findings can be found in the initial assessment below.  This includes our plan of care.  If you have any questions or concerns regarding these findings, please do not hesitate to contact me at the office phone number listed above.  Thank you for the referral.     Physician Signature:_______________________________Date:__________________  By signing above (or electronic signature), therapist’s plan is approved by physician       Physical Therapy: TREATMENT/PROGRESS NOTE   Patient: Nasima Muniz (49 y.o. female)   Examination Date: 2025   :  1975 MRN: [id number]   Visit #: 1   Insurance Allowable Auth Needed   ? [x]Yes    []No    Insurance: Payor: [employer] / Plan: [employer] [place]AN [place] / Product Type: *No Product type* /   Insurance ID: [id number] - (Medicaid Managed)  Secondary Insurance (if applicable):    Treatment Diagnosis:   Tx and LBP with impacted function, B ankle Pain with weakness and restricted mobility   Medical Diagnosis:  Low back pain, unspecified [M54.50]  Other chronic pain [G89.29]   Referring Physician: Pedro Stone MD  PCP: Pedro Stone MD     Plan of care signed (Y/N):     Date of Patient follow up with Physician:      Plan of Care Report: EVAL today  POC update due: (10 visits /OR AUTH LIMITS, whichever is less)  3/7/2025                                             Medical History:  Comorbidities:  Diabetes (Type I or II)  Anxiety  Depression  Relevant Medical History:                                          Precautions/ Contra-indications:           Latex

## 2025-02-09 DIAGNOSIS — E11.9 TYPE 2 DIABETES MELLITUS WITHOUT COMPLICATION, WITHOUT LONG-TERM CURRENT USE OF INSULIN (HCC): ICD-10-CM

## 2025-02-10 ENCOUNTER — PATIENT MESSAGE (OUTPATIENT)
Dept: PRIMARY CARE CLINIC | Age: 50
End: 2025-02-10

## 2025-02-10 ENCOUNTER — APPOINTMENT (OUTPATIENT)
Dept: PHYSICAL THERAPY | Age: 50
End: 2025-02-10
Payer: MEDICAID

## 2025-02-10 DIAGNOSIS — R73.9 HYPERGLYCEMIA: ICD-10-CM

## 2025-02-10 RX ORDER — ATORVASTATIN CALCIUM 20 MG/1
20 TABLET, FILM COATED ORAL DAILY
Qty: 90 TABLET | Refills: 0 | Status: SHIPPED | OUTPATIENT
Start: 2025-02-10

## 2025-02-10 NOTE — TELEPHONE ENCOUNTER
Medication:   Requested Prescriptions     Pending Prescriptions Disp Refills    atorvastatin (LIPITOR) 20 MG tablet 90 tablet 0     Sig: Take 1 tablet by mouth daily     Last Filled:  10.4.24    Last appt: 12/10/2024   Next appt: Visit date not found    Last Lipid:   Lab Results   Component Value Date/Time    CHOL 171 12/20/2024 12:42 PM    TRIG 98 12/20/2024 12:42 PM    HDL 47 12/20/2024 12:42 PM

## 2025-02-10 NOTE — TELEPHONE ENCOUNTER
Medication:   Requested Prescriptions     Pending Prescriptions Disp Refills    metFORMIN (GLUCOPHAGE) 500 MG tablet 180 tablet 0     Sig: Take 1 tablet by mouth 2 times daily (with meals)     Last Filled:  12/11/24    Last appt: 12/10/2024   Next appt: Visit date not found    Last Labs DM:   Lab Results   Component Value Date/Time    LABA1C 6.8 12/20/2024 12:42 PM

## 2025-03-11 ENCOUNTER — HOSPITAL ENCOUNTER (OUTPATIENT)
Dept: PHYSICAL THERAPY | Age: 50
Setting detail: THERAPIES SERIES
Discharge: HOME | End: 2025-03-11
Payer: MEDICAID

## 2025-03-11 PROCEDURE — 97140 MANUAL THERAPY 1/> REGIONS: CPT

## 2025-03-11 PROCEDURE — 97110 THERAPEUTIC EXERCISES: CPT

## 2025-03-11 NOTE — FLOWSHEET NOTE
[facility] - Outpatient Rehabilitation and Therapy: [address] [place] [address] office: [phone] fax: [phone]    Physical Therapy: TREATMENT/PROGRESS NOTE   Patient: Nasima Muniz (49 y.o. female)   Examination Date: 2025   :  1975 MRN: [id number]   Visit #:   Insurance Allowable Auth Needed   12V [x]Yes    []No   - 25    Insurance: Payor: [employer] / Plan: [employer] [place] / Product Type: *No Product type* /   Insurance ID: [id number] - (Medicaid Managed)  Secondary Insurance (if applicable):    Treatment Diagnosis:   Tx and LBP with impacted function, B ankle Pain with weakness and restricted mobility   Medical Diagnosis:  Low back pain, unspecified [M54.50]  Other chronic pain [G89.29]   Referring Physician: Pedro Stone MD  PCP: Pedro Stone MD     Plan of care signed (Y/N):     Date of Patient follow up with Physician:      Plan of Care Report: NO  POC update due: (10 visits /OR AUTH LIMITS, whichever is less)  4/10/2025                                             Medical History:  Comorbidities:  Diabetes (Type I or II)  Anxiety  Depression  Relevant Medical History:                                          Precautions/ Contra-indications:           Latex allergy:  NO  Pacemaker:    NO  Contraindications for Manipulation: None  Date of Surgery:   Other:    Red Flags:  None    Suicide Screening:   The patient did not verbalize a primary behavioral concern, suicidal ideation, suicidal intent, or demonstrate suicidal behaviors.    Preferred Language for Healthcare:   [] English       [x] other: Italian - no male . Son is a pharmacist and is allowed to interpret.    SUBJECTIVE EXAMINATION     History: Patient reports  LBP and pain near B kidney region. Patient has been checked in kidneys and bladder and everything is WNLs. She also has B ankle pain with restrictions. Her function is limited. 
No

## 2025-03-18 ENCOUNTER — HOSPITAL ENCOUNTER (OUTPATIENT)
Dept: PHYSICAL THERAPY | Age: 50
Setting detail: THERAPIES SERIES
Discharge: HOME | End: 2025-03-18
Payer: MEDICAID

## 2025-03-18 PROCEDURE — 97110 THERAPEUTIC EXERCISES: CPT | Performed by: PHYSICAL THERAPIST

## 2025-03-18 PROCEDURE — 97140 MANUAL THERAPY 1/> REGIONS: CPT | Performed by: PHYSICAL THERAPIST

## 2025-03-18 NOTE — FLOWSHEET NOTE
progress reps, and add new exercises     Electronically Signed by Layne Woodard, PTMPT [id number]     Date: 03/18/2025     Note: Portions of this note have been templated and/or copied from initial evaluation, reassessments and prior notes for documentation efficiency.    Note: If patient does not return for scheduled/recommended follow up visits, this note will serve as a discharge from care along with the most recent update on progress.

## 2025-03-25 ENCOUNTER — HOSPITAL ENCOUNTER (OUTPATIENT)
Dept: PHYSICAL THERAPY | Age: 50
Setting detail: THERAPIES SERIES
Discharge: HOME | End: 2025-03-25
Payer: MEDICAID

## 2025-03-25 PROCEDURE — 97112 NEUROMUSCULAR REEDUCATION: CPT

## 2025-03-25 PROCEDURE — 97530 THERAPEUTIC ACTIVITIES: CPT

## 2025-03-25 PROCEDURE — 97140 MANUAL THERAPY 1/> REGIONS: CPT

## 2025-03-25 PROCEDURE — 97110 THERAPEUTIC EXERCISES: CPT

## 2025-04-01 ENCOUNTER — HOSPITAL ENCOUNTER (OUTPATIENT)
Dept: PHYSICAL THERAPY | Age: 50
Setting detail: THERAPIES SERIES
Discharge: HOME | End: 2025-04-01
Payer: MEDICAID

## 2025-04-01 PROCEDURE — 97140 MANUAL THERAPY 1/> REGIONS: CPT

## 2025-04-01 PROCEDURE — 97110 THERAPEUTIC EXERCISES: CPT

## 2025-04-01 NOTE — FLOWSHEET NOTE
[facility] - Outpatient Rehabilitation and Therapy: [address] [place] [address] office: [phone] fax: [phone]  .  Physical Therapy: TREATMENT/PROGRESS NOTE   Patient: Nasima Muniz (49 y.o. female)   Examination Date: 2025   :  1975 MRN: [id number]   Visit #:   Insurance Allowable Auth Needed   12V; will need to request date ext on visits soon [x]Yes    []No   - 25    Insurance: Payor: [employer] / Plan: [employer] PLAN [place] / Product Type: *No Product type* /   Insurance ID: [id number] - (Medicaid Managed)  Secondary Insurance (if applicable):    Treatment Diagnosis:   Tx and LBP with impacted function, B ankle Pain with weakness and restricted mobility   Medical Diagnosis:  Low back pain, unspecified [M54.50]  Other chronic pain [G89.29]   Referring Physician: Pedro Stone MD  PCP: Pedro Stone MD     Plan of care signed (Y/N):     Date of Patient follow up with Physician:      Plan of Care Report: NO  POC update due: (10 visits /OR AUTH LIMITS, whichever is less)  2025                                             Medical History:  Comorbidities:  Diabetes (Type I or II)  Anxiety  Depression  Relevant Medical History:                                          Precautions/ Contra-indications:           Latex allergy:  NO  Pacemaker:    NO  Contraindications for Manipulation: None  Date of Surgery:   Other:    Red Flags:  None    Suicide Screening:   The patient did not verbalize a primary behavioral concern, suicidal ideation, suicidal intent, or demonstrate suicidal behaviors.    Preferred Language for Healthcare:   [] English       [x] other: Greek - no male . Son is a pharmacist and is allowed to interpret.    SUBJECTIVE EXAMINATION     History: Patient reports midline LBP is recent in last 3 months; denies numbness, but sometimes tingling in L LE intermittent WITH ONLY ASSOCIATED KNEE

## 2025-04-03 ENCOUNTER — APPOINTMENT (OUTPATIENT)
Dept: PHYSICAL THERAPY | Age: 50
End: 2025-04-03
Payer: MEDICAID

## 2025-04-08 ENCOUNTER — HOSPITAL ENCOUNTER (OUTPATIENT)
Dept: PHYSICAL THERAPY | Age: 50
Setting detail: THERAPIES SERIES
End: 2025-04-08
Payer: MEDICAID

## 2025-04-15 ENCOUNTER — HOSPITAL ENCOUNTER (OUTPATIENT)
Dept: PHYSICAL THERAPY | Age: 50
Setting detail: THERAPIES SERIES
Discharge: HOME | End: 2025-04-15
Payer: MEDICAID

## 2025-04-15 PROCEDURE — 97112 NEUROMUSCULAR REEDUCATION: CPT

## 2025-04-15 PROCEDURE — 97110 THERAPEUTIC EXERCISES: CPT

## 2025-04-15 PROCEDURE — 97140 MANUAL THERAPY 1/> REGIONS: CPT

## 2025-05-20 DIAGNOSIS — E11.9 TYPE 2 DIABETES MELLITUS WITHOUT COMPLICATION, WITHOUT LONG-TERM CURRENT USE OF INSULIN (HCC): ICD-10-CM

## 2025-05-21 NOTE — TELEPHONE ENCOUNTER
Medication:   Requested Prescriptions     Pending Prescriptions Disp Refills    atorvastatin (LIPITOR) 20 MG tablet [Pharmacy Med Name: Atorvastatin Calcium Oral Tablet 20 MG] 90 tablet 0     Sig: TAKE 1 TABLET BY MOUTH EVERY DAY     Last Filled:  Lm for patient to schedule appoint    Last appt: 12/10/2024   Next appt: Visit date not found    Last Lipid:   Lab Results   Component Value Date/Time    CHOL 171 12/20/2024 12:42 PM    TRIG 98 12/20/2024 12:42 PM    HDL 47 12/20/2024 12:42 PM

## 2025-05-23 RX ORDER — ATORVASTATIN CALCIUM 20 MG/1
20 TABLET, FILM COATED ORAL DAILY
Qty: 90 TABLET | Refills: 0 | Status: SHIPPED | OUTPATIENT
Start: 2025-05-23